# Patient Record
Sex: FEMALE | Race: WHITE | Employment: OTHER | URBAN - METROPOLITAN AREA
[De-identification: names, ages, dates, MRNs, and addresses within clinical notes are randomized per-mention and may not be internally consistent; named-entity substitution may affect disease eponyms.]

---

## 2024-10-11 ENCOUNTER — HOSPITAL ENCOUNTER (OUTPATIENT)
Dept: ULTRASOUND IMAGING | Age: 49
Discharge: HOME | End: 2024-10-13
Payer: COMMERCIAL

## 2024-10-11 DIAGNOSIS — N30.01 ACUTE CYSTITIS WITH HEMATURIA: ICD-10-CM

## 2024-10-11 PROCEDURE — 76770 US EXAM ABDO BACK WALL COMP: CPT

## 2024-10-22 ENCOUNTER — HOSPITAL ENCOUNTER (OUTPATIENT)
Dept: CT IMAGING | Age: 49
Discharge: HOME | End: 2024-10-24
Payer: COMMERCIAL

## 2024-10-22 DIAGNOSIS — N28.89 LEFT RENAL MASS: ICD-10-CM

## 2024-10-22 DIAGNOSIS — R31.0 GROSS HEMATURIA: ICD-10-CM

## 2024-10-22 PROCEDURE — 6360000004 HC RX CONTRAST MEDICATION: Performed by: FAMILY MEDICINE

## 2024-10-22 PROCEDURE — 74178 CT ABD&PLV WO CNTR FLWD CNTR: CPT

## 2024-10-22 PROCEDURE — 2580000003 HC RX 258: Performed by: FAMILY MEDICINE

## 2024-10-22 RX ORDER — SODIUM CHLORIDE 0.9 % (FLUSH) 0.9 %
10 SYRINGE (ML) INJECTION PRN
Status: DISCONTINUED | OUTPATIENT
Start: 2024-10-22 | End: 2024-10-25 | Stop reason: HOSPADM

## 2024-10-22 RX ORDER — IOPAMIDOL 755 MG/ML
100 INJECTION, SOLUTION INTRAVASCULAR
Status: COMPLETED | OUTPATIENT
Start: 2024-10-22 | End: 2024-10-22

## 2024-10-22 RX ORDER — 0.9 % SODIUM CHLORIDE 0.9 %
80 INTRAVENOUS SOLUTION INTRAVENOUS ONCE
Status: COMPLETED | OUTPATIENT
Start: 2024-10-22 | End: 2024-10-22

## 2024-10-22 RX ADMIN — SODIUM CHLORIDE, PRESERVATIVE FREE 10 ML: 5 INJECTION INTRAVENOUS at 09:39

## 2024-10-22 RX ADMIN — IOPAMIDOL 100 ML: 755 INJECTION, SOLUTION INTRAVENOUS at 09:39

## 2024-10-22 RX ADMIN — SODIUM CHLORIDE 80 ML: 9 INJECTION, SOLUTION INTRAVENOUS at 09:39

## 2024-11-01 ENCOUNTER — HOSPITAL ENCOUNTER (OUTPATIENT)
Dept: PREADMISSION TESTING | Age: 49
Setting detail: OUTPATIENT SURGERY
Discharge: HOME | End: 2024-11-05
Payer: COMMERCIAL

## 2024-11-01 ENCOUNTER — ANESTHESIA EVENT (OUTPATIENT)
Dept: OPERATING ROOM | Age: 49
End: 2024-11-01
Payer: COMMERCIAL

## 2024-11-01 VITALS — HEIGHT: 66 IN | WEIGHT: 160 LBS | BODY MASS INDEX: 25.71 KG/M2

## 2024-11-01 RX ORDER — METHYLDOPA/HYDROCHLOROTHIAZIDE 250MG-15MG
1 TABLET ORAL DAILY
COMMUNITY

## 2024-11-04 ENCOUNTER — HOSPITAL ENCOUNTER (OUTPATIENT)
Age: 49
Setting detail: OUTPATIENT SURGERY
Discharge: HOME | End: 2024-11-04
Attending: UROLOGY | Admitting: UROLOGY
Payer: COMMERCIAL

## 2024-11-04 ENCOUNTER — APPOINTMENT (OUTPATIENT)
Dept: GENERAL RADIOLOGY | Age: 49
End: 2024-11-04
Attending: UROLOGY
Payer: COMMERCIAL

## 2024-11-04 ENCOUNTER — ANESTHESIA (OUTPATIENT)
Dept: OPERATING ROOM | Age: 49
End: 2024-11-04
Payer: COMMERCIAL

## 2024-11-04 VITALS
OXYGEN SATURATION: 98 % | HEART RATE: 63 BPM | TEMPERATURE: 97.1 F | RESPIRATION RATE: 16 BRPM | DIASTOLIC BLOOD PRESSURE: 74 MMHG | HEIGHT: 66 IN | WEIGHT: 160 LBS | SYSTOLIC BLOOD PRESSURE: 118 MMHG | BODY MASS INDEX: 25.71 KG/M2

## 2024-11-04 DIAGNOSIS — R31.0 GROSS HEMATURIA: ICD-10-CM

## 2024-11-04 PROCEDURE — 3700000000 HC ANESTHESIA ATTENDED CARE: Performed by: UROLOGY

## 2024-11-04 PROCEDURE — C1769 GUIDE WIRE: HCPCS | Performed by: UROLOGY

## 2024-11-04 PROCEDURE — 3600000012 HC SURGERY LEVEL 2 ADDTL 15MIN: Performed by: UROLOGY

## 2024-11-04 PROCEDURE — 3700000001 HC ADD 15 MINUTES (ANESTHESIA): Performed by: UROLOGY

## 2024-11-04 PROCEDURE — 6360000004 HC RX CONTRAST MEDICATION: Performed by: UROLOGY

## 2024-11-04 PROCEDURE — 2709999900 HC NON-CHARGEABLE SUPPLY: Performed by: UROLOGY

## 2024-11-04 PROCEDURE — 88305 TISSUE EXAM BY PATHOLOGIST: CPT

## 2024-11-04 PROCEDURE — 6370000000 HC RX 637 (ALT 250 FOR IP): Performed by: ANESTHESIOLOGY

## 2024-11-04 PROCEDURE — 81025 URINE PREGNANCY TEST: CPT

## 2024-11-04 PROCEDURE — 6360000002 HC RX W HCPCS

## 2024-11-04 PROCEDURE — 7100000031 HC ASPR PHASE II RECOVERY - ADDTL 15 MIN: Performed by: UROLOGY

## 2024-11-04 PROCEDURE — 2500000003 HC RX 250 WO HCPCS: Performed by: ANESTHESIOLOGY

## 2024-11-04 PROCEDURE — C2617 STENT, NON-COR, TEM W/O DEL: HCPCS | Performed by: UROLOGY

## 2024-11-04 PROCEDURE — 7100000001 HC PACU RECOVERY - ADDTL 15 MIN: Performed by: UROLOGY

## 2024-11-04 PROCEDURE — 7100000010 HC PHASE II RECOVERY - FIRST 15 MIN: Performed by: UROLOGY

## 2024-11-04 PROCEDURE — 2580000003 HC RX 258: Performed by: ANESTHESIOLOGY

## 2024-11-04 PROCEDURE — 2500000003 HC RX 250 WO HCPCS

## 2024-11-04 PROCEDURE — 3600000002 HC SURGERY LEVEL 2 BASE: Performed by: UROLOGY

## 2024-11-04 PROCEDURE — 7100000011 HC PHASE II RECOVERY - ADDTL 15 MIN: Performed by: UROLOGY

## 2024-11-04 PROCEDURE — 6360000002 HC RX W HCPCS: Performed by: UROLOGY

## 2024-11-04 PROCEDURE — 7100000030 HC ASPR PHASE II RECOVERY - FIRST 15 MIN: Performed by: UROLOGY

## 2024-11-04 PROCEDURE — 7100000000 HC PACU RECOVERY - FIRST 15 MIN: Performed by: UROLOGY

## 2024-11-04 PROCEDURE — C1758 CATHETER, URETERAL: HCPCS | Performed by: UROLOGY

## 2024-11-04 DEVICE — URETERAL STENT
Type: IMPLANTABLE DEVICE | Site: URETER | Status: FUNCTIONAL
Brand: POLARIS™ ULTRA

## 2024-11-04 RX ORDER — SODIUM CHLORIDE 0.9 % (FLUSH) 0.9 %
5-40 SYRINGE (ML) INJECTION PRN
Status: DISCONTINUED | OUTPATIENT
Start: 2024-11-04 | End: 2024-11-04 | Stop reason: HOSPADM

## 2024-11-04 RX ORDER — SODIUM CHLORIDE 0.9 % (FLUSH) 0.9 %
5-40 SYRINGE (ML) INJECTION EVERY 12 HOURS SCHEDULED
Status: DISCONTINUED | OUTPATIENT
Start: 2024-11-04 | End: 2024-11-04 | Stop reason: HOSPADM

## 2024-11-04 RX ORDER — HYDROCODONE BITARTRATE AND ACETAMINOPHEN 5; 325 MG/1; MG/1
1 TABLET ORAL EVERY 6 HOURS PRN
Qty: 10 TABLET | Refills: 0 | Status: SHIPPED | OUTPATIENT
Start: 2024-11-04 | End: 2024-11-07

## 2024-11-04 RX ORDER — FENTANYL CITRATE 50 UG/ML
INJECTION, SOLUTION INTRAMUSCULAR; INTRAVENOUS
Status: DISCONTINUED | OUTPATIENT
Start: 2024-11-04 | End: 2024-11-04 | Stop reason: SDUPTHER

## 2024-11-04 RX ORDER — LIDOCAINE HYDROCHLORIDE 20 MG/ML
INJECTION, SOLUTION EPIDURAL; INFILTRATION; INTRACAUDAL; PERINEURAL
Status: DISCONTINUED | OUTPATIENT
Start: 2024-11-04 | End: 2024-11-04 | Stop reason: SDUPTHER

## 2024-11-04 RX ORDER — SODIUM CHLORIDE 9 MG/ML
INJECTION, SOLUTION INTRAVENOUS PRN
Status: DISCONTINUED | OUTPATIENT
Start: 2024-11-04 | End: 2024-11-04 | Stop reason: HOSPADM

## 2024-11-04 RX ORDER — LIDOCAINE HYDROCHLORIDE 10 MG/ML
1 INJECTION, SOLUTION EPIDURAL; INFILTRATION; INTRACAUDAL; PERINEURAL
Status: COMPLETED | OUTPATIENT
Start: 2024-11-04 | End: 2024-11-04

## 2024-11-04 RX ORDER — FENTANYL CITRATE 0.05 MG/ML
25 INJECTION, SOLUTION INTRAMUSCULAR; INTRAVENOUS EVERY 5 MIN PRN
Status: DISCONTINUED | OUTPATIENT
Start: 2024-11-04 | End: 2024-11-04 | Stop reason: HOSPADM

## 2024-11-04 RX ORDER — SODIUM CHLORIDE, SODIUM LACTATE, POTASSIUM CHLORIDE, CALCIUM CHLORIDE 600; 310; 30; 20 MG/100ML; MG/100ML; MG/100ML; MG/100ML
INJECTION, SOLUTION INTRAVENOUS CONTINUOUS
Status: DISCONTINUED | OUTPATIENT
Start: 2024-11-04 | End: 2024-11-04 | Stop reason: HOSPADM

## 2024-11-04 RX ORDER — DEXAMETHASONE SODIUM PHOSPHATE 4 MG/ML
INJECTION, SOLUTION INTRA-ARTICULAR; INTRALESIONAL; INTRAMUSCULAR; INTRAVENOUS; SOFT TISSUE
Status: DISCONTINUED | OUTPATIENT
Start: 2024-11-04 | End: 2024-11-04 | Stop reason: SDUPTHER

## 2024-11-04 RX ORDER — NALOXONE HYDROCHLORIDE 0.4 MG/ML
INJECTION, SOLUTION INTRAMUSCULAR; INTRAVENOUS; SUBCUTANEOUS PRN
Status: DISCONTINUED | OUTPATIENT
Start: 2024-11-04 | End: 2024-11-04 | Stop reason: HOSPADM

## 2024-11-04 RX ORDER — DIPHENHYDRAMINE HYDROCHLORIDE 50 MG/ML
12.5 INJECTION INTRAMUSCULAR; INTRAVENOUS
Status: DISCONTINUED | OUTPATIENT
Start: 2024-11-04 | End: 2024-11-04 | Stop reason: HOSPADM

## 2024-11-04 RX ORDER — CEFAZOLIN SODIUM/WATER 2 G/20 ML
2000 SYRINGE (ML) INTRAVENOUS ONCE
Status: COMPLETED | OUTPATIENT
Start: 2024-11-04 | End: 2024-11-04

## 2024-11-04 RX ORDER — IOPAMIDOL 510 MG/ML
INJECTION, SOLUTION INTRAVASCULAR PRN
Status: DISCONTINUED | OUTPATIENT
Start: 2024-11-04 | End: 2024-11-04 | Stop reason: ALTCHOICE

## 2024-11-04 RX ORDER — ONDANSETRON 2 MG/ML
INJECTION INTRAMUSCULAR; INTRAVENOUS
Status: DISCONTINUED | OUTPATIENT
Start: 2024-11-04 | End: 2024-11-04 | Stop reason: SDUPTHER

## 2024-11-04 RX ORDER — PROPOFOL 10 MG/ML
INJECTION, EMULSION INTRAVENOUS
Status: DISCONTINUED | OUTPATIENT
Start: 2024-11-04 | End: 2024-11-04 | Stop reason: SDUPTHER

## 2024-11-04 RX ORDER — ONDANSETRON 2 MG/ML
4 INJECTION INTRAMUSCULAR; INTRAVENOUS
Status: DISCONTINUED | OUTPATIENT
Start: 2024-11-04 | End: 2024-11-04 | Stop reason: HOSPADM

## 2024-11-04 RX ORDER — METOCLOPRAMIDE HYDROCHLORIDE 5 MG/ML
10 INJECTION INTRAMUSCULAR; INTRAVENOUS
Status: DISCONTINUED | OUTPATIENT
Start: 2024-11-04 | End: 2024-11-04 | Stop reason: HOSPADM

## 2024-11-04 RX ORDER — ACETAMINOPHEN 500 MG
1000 TABLET ORAL ONCE
Status: COMPLETED | OUTPATIENT
Start: 2024-11-04 | End: 2024-11-04

## 2024-11-04 RX ORDER — GABAPENTIN 300 MG/1
300 CAPSULE ORAL ONCE
Status: COMPLETED | OUTPATIENT
Start: 2024-11-04 | End: 2024-11-04

## 2024-11-04 RX ORDER — MIDAZOLAM HYDROCHLORIDE 1 MG/ML
INJECTION, SOLUTION INTRAMUSCULAR; INTRAVENOUS
Status: DISCONTINUED | OUTPATIENT
Start: 2024-11-04 | End: 2024-11-04 | Stop reason: SDUPTHER

## 2024-11-04 RX ADMIN — MIDAZOLAM 2 MG: 1 INJECTION INTRAMUSCULAR; INTRAVENOUS at 13:28

## 2024-11-04 RX ADMIN — LIDOCAINE HYDROCHLORIDE 40 MG: 20 INJECTION, SOLUTION EPIDURAL; INFILTRATION; INTRACAUDAL; PERINEURAL at 13:34

## 2024-11-04 RX ADMIN — FENTANYL CITRATE 25 MCG: 50 INJECTION INTRAMUSCULAR; INTRAVENOUS at 13:34

## 2024-11-04 RX ADMIN — LIDOCAINE HYDROCHLORIDE 1 ML: 10 INJECTION, SOLUTION EPIDURAL; INFILTRATION; INTRACAUDAL; PERINEURAL at 12:15

## 2024-11-04 RX ADMIN — GABAPENTIN 300 MG: 300 CAPSULE ORAL at 12:15

## 2024-11-04 RX ADMIN — ACETAMINOPHEN 1000 MG: 500 TABLET ORAL at 12:14

## 2024-11-04 RX ADMIN — DEXAMETHASONE SODIUM PHOSPHATE 10 MG: 4 INJECTION INTRA-ARTICULAR; INTRALESIONAL; INTRAMUSCULAR; INTRAVENOUS; SOFT TISSUE at 13:40

## 2024-11-04 RX ADMIN — PROPOFOL 160 MG: 10 INJECTION, EMULSION INTRAVENOUS at 13:34

## 2024-11-04 RX ADMIN — SODIUM CHLORIDE, PRESERVATIVE FREE 10 ML: 5 INJECTION INTRAVENOUS at 12:23

## 2024-11-04 RX ADMIN — ONDANSETRON 4 MG: 2 INJECTION, SOLUTION INTRAMUSCULAR; INTRAVENOUS at 13:40

## 2024-11-04 RX ADMIN — Medication 2000 MG: at 13:32

## 2024-11-04 RX ADMIN — SODIUM CHLORIDE, POTASSIUM CHLORIDE, SODIUM LACTATE AND CALCIUM CHLORIDE: 600; 310; 30; 20 INJECTION, SOLUTION INTRAVENOUS at 13:28

## 2024-11-04 RX ADMIN — FENTANYL CITRATE 25 MCG: 50 INJECTION INTRAMUSCULAR; INTRAVENOUS at 13:55

## 2024-11-04 ASSESSMENT — PAIN - FUNCTIONAL ASSESSMENT
PAIN_FUNCTIONAL_ASSESSMENT: NONE - DENIES PAIN
PAIN_FUNCTIONAL_ASSESSMENT: NONE - DENIES PAIN
PAIN_FUNCTIONAL_ASSESSMENT: 0-10

## 2024-11-04 ASSESSMENT — ENCOUNTER SYMPTOMS
RESPIRATORY NEGATIVE: 1
GASTROINTESTINAL NEGATIVE: 1

## 2024-11-04 ASSESSMENT — PAIN DESCRIPTION - DESCRIPTORS: DESCRIPTORS: PRESSURE

## 2024-11-04 NOTE — H&P
HISTORY and PHYSICAL  [facility]       NAME:  Alyce Nolasco  MRN: [id number]   YOB: 1975   Date: 11/4/2024   Age: 49 y.o.  Gender: female       COMPLAINT AND PRESENT HISTORY:     Alyce Nolasco is 49 y.o.,  female, presents for CYSTOSCOPY RETROGRADE PYELOGRAM LEFT, URETEROSCOPY WITH POSSIBLE URETERAL BIOPSY   LEFT   Primary dx: Gross hematuria [R31.0].   HPI:  Alyce Nolasco is 49 y.o.,   female, C/O of  blood in the urine started six weeks ago, poor flow of urine. No nausea,  vomiting or diaphoresis.  No dysuria. No no fever or chills. Pt had US renal on 10/11/24 which showed 3.1 cm solid appearing left renal mass, then she had CT which showed 3.6 cm isodense nonenhancing mass in the left renal collecting system suggesting a neoplastic process likely transitional cell carcinoma.  Pt denies fever/chills, chest pain or SOB.       US RENAL COMPLETE   IMPRESSION:     3.1 cm solid appearing left renal mass.  CT or MRI renal protocol is   recommended for further evaluation.     CT ABDOMEN PELVIS W WO CONTRAST Additional Contrast? None [[id number]]   IMPRESSION:  1. 3.6 cm isodense nonenhancing mass in the left renal collecting system  suggesting a neoplastic process likely transitional cell carcinoma.  2. Multiple hepatic cysts.    Review of additional significant medical hx:  (See chart for additional detail, including current medications /see ROS for current S/S):       NPO status: pt NPO since the past midnight   Medications taken TODAY (with sip of water): none  Anticoagulation status: none    Denies personal hx of blood clots.  Denies personal hx of MRSA infection.  Denies any personal or family hx of previous complications w/anesthesia.    PAST MEDICAL HISTORY   No past medical history on file.    SURGICAL HISTORY       Past Surgical History:   Procedure Laterality Date    DILATION AND CURETTAGE OF UTERUS  2004    FRACTURE SURGERY Left     tib and Fib as child       FAMILY HISTORY     No

## 2024-11-04 NOTE — PROGRESS NOTES
Urine pregnancy negative, results faxed to lab.     
prepared for surgery.  A physical assessment will be performed by a nurse practitioner or house officer.  Your IV will be started and you will meet your anesthesiologist.    When you go to surgery, your family will be directed to the surgical waiting room, where the doctor should speak with them after your surgery.    After surgery, you will be taken to the recovery area.  When you are alert and stable, you will receive instructions and be prepared for discharge.     If you use a Bi-PAP or C-PAP machine, please bring it with you and leave it in the car in case it is needed in recovery room.    Reviewed with Patient over phone and she verbalized understanding

## 2024-11-04 NOTE — ANESTHESIA POSTPROCEDURE EVALUATION
Department of Anesthesiology  Postprocedure Note    Patient: Alyce Nolasco  MRN: [id number]  YOB: 1975  Date of evaluation: 11/4/2024    Procedure Summary       Date: 11/04/24 Room / Location: [place] [address] / [facility]    Anesthesia Start: 1326 Anesthesia Stop: 1420    Procedures:       CYSTOSCOPY RETROGRADE PYELOGRAM LEFT (Left: Ureter)      URETEROSCOPY WITH URETERAL RENAL PELVIC BIOPSY LEFT (Left: Ureter) Diagnosis:       Gross hematuria      (Gross hematuria [R31.0])    Surgeons: Jimbo Aly MD Responsible Provider: Ethan Schofield MD    Anesthesia Type: general ASA Status: 2            Anesthesia Type: No value filed.    Mary Ann Phase I: Mary Ann Score: 10    Mary Ann Phase II: Mary Ann Score: 10    Anesthesia Post Evaluation    Comments: POST- ANESTHESIA EVALUATION       Pt Name: Alyce Nolasco  MRN: [id number]  YOB: 1975  Date of evaluation: 11/4/2024  Time:  3:06 PM      /74   Pulse 63   Temp 97.1 °F (36.2 °C) (Infrared)   Resp 16   Ht 1.676 m (5' 6\")   Wt 72.6 kg (160 lb)   LMP 10/18/2024 (Exact Date)   SpO2 98%   BMI 25.82 kg/m²      Consciousness Level  Awake  Cardiopulmonary Status  Stable  Pain Adequately Treated YES  Nausea / Vomiting  NO  Adequate Hydration  YES  Anesthesia Related Complications NONE      Electronically signed by Angélica Godfrey MD on 11/4/2024 at 3:06 PM           No notable events documented.

## 2024-11-04 NOTE — ANESTHESIA PRE PROCEDURE
Department of Anesthesiology  Preprocedure Note       Name:  Alyce Nolasco   Age:  49 y.o.  :  1975                                          MRN:  [id number]         Date:  2024      Surgeon: Surgeon(s):  Jimbo Aly MD    Procedure: Procedure(s):  CYSTOSCOPY RETROGRADE PYELOGRAM LEFT  URETEROSCOPY WITH POSSIBLE URETERAL BIOPSY LEFT    Medications prior to admission:   Prior to Admission medications    Medication Sig Start Date End Date Taking? Authorizing Provider   Multiple Vitamin (DAILY VITAMIN FORMULA PO) Daily Vitamin    Sergei Tapia MD   Multiple Vitamins-Minerals (HAIR SKIN & NAILS ADVANCED PO) Take by mouth    Sergei Tapia MD   Probiotic CHEW Take 1 tablet by mouth daily    Sergei Tapia MD       Current medications:    Current Facility-Administered Medications   Medication Dose Route Frequency Provider Last Rate Last Admin   • lidocaine PF 1 % injection 1 mL  1 mL IntraDERmal Once PRN Ricardo Au MD       • acetaminophen (TYLENOL) tablet 1,000 mg  1,000 mg Oral Once Ricardo Au MD       • gabapentin (NEURONTIN) capsule 300 mg  300 mg Oral Once Ricardo Au MD       • lactated ringers infusion   IntraVENous Continuous Ricardo Au MD       • sodium chloride flush 0.9 % injection 5-40 mL  5-40 mL IntraVENous 2 times per day Ricardo Au MD       • sodium chloride flush 0.9 % injection 5-40 mL  5-40 mL IntraVENous PRN Ricardo Au MD       • 0.9 % sodium chloride infusion   IntraVENous PRN Ricardo Au MD           Allergies:    Allergies   Allergen Reactions   • Prednisone Shortness Of Breath     High Dose only       Problem List:  There is no problem list on file for this patient.      Past Medical History:  History reviewed. No pertinent past medical history.    Past Surgical History:        Procedure Laterality Date   • DILATION AND CURETTAGE OF UTERUS     • FRACTURE SURGERY Left     tib and Fib as child       Social

## 2024-11-04 NOTE — OP NOTE
Operative Note      Patient: Alyce Nolasco  YOB: 1975  MRN: [id number]    Date of Procedure: 11/4/2024    Pre-Op Diagnosis Codes:      * Gross hematuria [R31.0]    Post-Op Diagnosis: Same       Procedure(s):  CYSTOSCOPY RETROGRADE PYELOGRAM LEFT  URETEROSCOPY WITH URETERAL RENAL PELVIS BIOPSY LEFT    Surgeon(s):  Jimbo Aly MD    Assistant:   * No surgical staff found *    Anesthesia: General    Estimated Blood Loss (mL): Minimal    Complications: None    Specimens:   ID Type Source Tests Collected by Time Destination   1 :  Urine Urine, Cystoscopic FISH, UROVYSION Jimbo Aly MD 11/4/2024 1408    A : LEFT RENAL PELVIX TUMOR Tissue Kidney SURGICAL PATHOLOGY Jimbo Aly MD 11/4/2024 1401        Implants:  Implant Name Type Inv. Item Serial No.  Lot No. LRB No. Used Action   STENT URET 6FR L26CM PERCFLX HYDR+ DBL PGTL THRD 2 - [id number]  STENT URET 6FR L26CM PERCFLX HYDR+ DBL PGTL THRD 2  "[employer] [facility] UROLOGY- [id number] Left 1 Implanted         Drains: * No LDAs found *    Findings:  Infection Present At Time Of Surgery (PATOS) (choose all levels that have infection present):  No infection present  Other Findings:   Alyce is a 49-year-old female with gross hematuria.  CT scan was done which demonstrated a concerning mass possibly in the upper pole the left kidney.  She is here for cystoscopy retrograde pyelogram and ureteroscopy with possible biopsy    Detailed Description of Procedure:   Patient was brought back to the operating room and laid in the operatively supine position.  Once general esthesia was obtained, she was placed in the dorsolithotomy position prepped and draped in the usual sterile fashion.  Timeout was performed, she was prepped identified, and antibiotics were administered.  The cystoscope was inserted through the urethra into the bladder.  Bladder was visualized in its entirety was unremarkable.  No tumors were identified throughout.  Urine was collected

## 2024-11-04 NOTE — DISCHARGE INSTRUCTIONS
Remove stent 48 hours  Prescription sent  Call for follow up        DISCHARGE INSTRUCTIONS FOR CYSTOSCOPY    In order to continue your care at home, please follow the instructions below.    For General Anesthesia  Do not drink any alcoholic beverages or make any legal or important decisions for 24 hours.   No driving or operating machinery for 24 hours.     Diet    Drink plenty of fluids after surgery, unless you are on a fluid restriction.  After general anesthesia, start out eating lightly (broth, soup, crackers, toast, etc.) advancing as tolerated to your usual diet.  Try to avoid spicy or greasy/fatty foods for 24 hours.  Avoid milk products for several hours.    Medications  Take medications as instructed by your surgeon.   Please do not take prescribed pain medication with alcoholic beverages.  Do not drive or operate machinery while taking any prescribed pain medication.    Activities  As instructed by your surgeon.  Limit your activities for 24 hours.  Avoid heavy work or sports until surgeon approves.  No lifting, pushing, pulling, straining until surgeon approves.  You may shower.    Surgery Area or Examination Site  After the cystoscopy, your urethra may be sore at first, and it may burn when you urinate for the first few days after the procedure.  You may feel the need to urinate more often, and your urine may be pink.   These symptoms should get better in 1 or 2 days.     Call your surgeon for the following:  You have pain that does not get better after you take pain medicine.   For an oral temperature (by mouth) is 101 degrees or higher, chills, or excessive sweating.  Persistent nausea or vomiting and can’t keep fluids down.  You have trouble passing urine or stool, especially if you have pain or swelling in your lower belly.   If you are unable to urinate within 8 hours of surgery.  Your urine is still red or you see blood clots after you have urinated several times.  Your urine gets cloudy or smells

## 2024-11-07 LAB — SURGICAL PATHOLOGY REPORT: NORMAL

## 2024-11-11 LAB — UROTHELIAL CANCER DETECTION: NORMAL

## 2024-11-26 RX ORDER — SODIUM CHLORIDE, SODIUM LACTATE, POTASSIUM CHLORIDE, CALCIUM CHLORIDE 600; 310; 30; 20 MG/100ML; MG/100ML; MG/100ML; MG/100ML
INJECTION, SOLUTION INTRAVENOUS CONTINUOUS
OUTPATIENT
Start: 2024-11-26

## 2024-11-26 NOTE — DISCHARGE INSTRUCTIONS
Pre-operative Instructions           NOTHING to eat or drink after midnight the night prior to surgery   (This includes gum, candy, mints, chewing tobacco, etc). Smoking cessation is always advised.   (Follow bowel prep or diet instructions as/if instructed by your surgeon.)    Please arrive at the surgery center (Entrance B) by 11:00 AM on 12/16/2024 (or as directed by your surgeon's office).  See Directons to Surgery Center on next page.     Please take only the following medication(s) the day of surgery with a small sip of water: n/a    If applicable:   -Use/bring daily inhalers with you   -Do not take diabetic medications on the day of surgery.    Please stop any blood thinning medications  AS DIRECTED BY PRESCRIBING PROVIDER!     Failure to stop these medications as instructed (too soon or too late) may result in injury to you, or your surgery may need to be rescheduled.   Below is a list of some examples for your reference. This is not an all-inclusive list and if you have any questions/concerns, please check with your surgeon's office.     Antiplatelets : (stop blood cells (called platelets) from sticking together and forming a blood clot):   Aspirin (Bufferin, Ecotrin), Clopidogrel (Plavix), Ticagrelor (Brilinta), Prasugrel (Effient), Dipyridamole/aspirin (Aggrenox), Ticlopidine (Ticlid), Eptifibatide (Integrilin)    Anticoagulants: (slow down your body's process of making clots):   Warfarin (Coumadin), Rivaroxaban (Xarelto), Dabigatran (Pradaxa), Apixaban (Eliquis), Edoxaban (Savaysa), Heparin, Enoxaparin (Lovenox), Fondaparinux (Arixtra)    NSAIDS: Aspirin (Bufferin, Ecotrin), Ibuprofen (Motrin, Nuprin,Advil), Naproxen (Aleve),Meloxicam (Mobic), Celecoxib (Celebrex), Diclofenac (Voltaren), Etodolac (Lodine), Indomethacin, Ketorolac, Nabumetone, Oxaprozin (Daypro), Piroxicam (Feldene), Excedrin (has aspirin in it)    Herbals/supplements: Bromelain, Cinnamon, Cayenne Pepper, Dong quai (female ginseng), Fish

## 2024-12-03 ENCOUNTER — HOSPITAL ENCOUNTER (OUTPATIENT)
Dept: PREADMISSION TESTING | Age: 49
Discharge: HOME | End: 2024-12-07
Payer: COMMERCIAL

## 2024-12-03 VITALS
HEIGHT: 66 IN | HEART RATE: 89 BPM | WEIGHT: 160 LBS | SYSTOLIC BLOOD PRESSURE: 137 MMHG | TEMPERATURE: 98.2 F | OXYGEN SATURATION: 100 % | DIASTOLIC BLOOD PRESSURE: 81 MMHG | BODY MASS INDEX: 25.71 KG/M2 | RESPIRATION RATE: 18 BRPM

## 2024-12-03 LAB
ABO + RH BLD: NORMAL
ANION GAP SERPL CALCULATED.3IONS-SCNC: 9 MMOL/L (ref 9–16)
ARM BAND NUMBER: NORMAL
BLOOD BANK SAMPLE EXPIRATION: NORMAL
BLOOD GROUP ANTIBODIES SERPL: NEGATIVE
BUN SERPL-MCNC: 10 MG/DL (ref 6–20)
CALCIUM SERPL-MCNC: 9.5 MG/DL (ref 8.6–10.4)
CHLORIDE SERPL-SCNC: 104 MMOL/L (ref 98–107)
CO2 SERPL-SCNC: 27 MMOL/L (ref 20–31)
CREAT SERPL-MCNC: 0.8 MG/DL (ref 0.6–0.9)
ERYTHROCYTE [DISTWIDTH] IN BLOOD BY AUTOMATED COUNT: 11.3 % (ref 11.8–14.4)
GFR, ESTIMATED: >90 ML/MIN/1.73M2
GLUCOSE SERPL-MCNC: 96 MG/DL (ref 74–99)
HCT VFR BLD AUTO: 40.1 % (ref 36.3–47.1)
HGB BLD-MCNC: 12.9 G/DL (ref 11.9–15.1)
MCH RBC QN AUTO: 30.4 PG (ref 25.2–33.5)
MCHC RBC AUTO-ENTMCNC: 32.2 G/DL (ref 28.4–34.8)
MCV RBC AUTO: 94.4 FL (ref 82.6–102.9)
NRBC BLD-RTO: 0 PER 100 WBC
PLATELET # BLD AUTO: 378 K/UL (ref 138–453)
PMV BLD AUTO: 9.8 FL (ref 8.1–13.5)
POTASSIUM SERPL-SCNC: 3.7 MMOL/L (ref 3.7–5.3)
RBC # BLD AUTO: 4.25 M/UL (ref 3.95–5.11)
SODIUM SERPL-SCNC: 140 MMOL/L (ref 136–145)
WBC OTHER # BLD: 8.6 K/UL (ref 3.5–11.3)

## 2024-12-03 PROCEDURE — 80048 BASIC METABOLIC PNL TOTAL CA: CPT

## 2024-12-03 PROCEDURE — 86901 BLOOD TYPING SEROLOGIC RH(D): CPT

## 2024-12-03 PROCEDURE — 85027 COMPLETE CBC AUTOMATED: CPT

## 2024-12-03 PROCEDURE — 86850 RBC ANTIBODY SCREEN: CPT

## 2024-12-03 PROCEDURE — 87086 URINE CULTURE/COLONY COUNT: CPT

## 2024-12-03 PROCEDURE — 86900 BLOOD TYPING SEROLOGIC ABO: CPT

## 2024-12-03 NOTE — PROGRESS NOTES
Anesthesia Focused Assessment  Upcoming surgery:   12/16/2024 XI ROBOTIC LAPAROSCOPIC  NEPHROURETERECTOMY - Left Jimbo Aly MD     History of anesthesia complications:  PONV  Family history of anesthesia complications:  unknown     METS functional capacity:   -Moderate/Excellent >4     + Covid-19 test in last 8 weeks? no    STOP-BANG Sleep Apnea Questionnaire  SNORE loudly (heard through closed doors)?   No  TIRED, fatigued, sleepy during daytime?    No  OBSERVED stopping breathing during sleep?   No  High blood PRESSURE or being treated?    No    BMI over 35?        No  AGE over 50?        No  NECK circumference over 16\"?     No  GENDER (male)?       No             Total zero  High risk 5-8  Intermediate risk 3-4  Low risk 0-2    ----------------------------------------------------------------------------------------------------------------------  ROMAN:                                             no  If yes, machine?:                              Type 1 DM:                                   no  T2DM:                                           no    Coronary Artery Disease:             no  Hypertension:                               no  Defib / AICD / Pacemaker:           no  Active, no cardiopulmonary complaints. Denies cardiac history.   Patient reports she follows with PCP (Dr. Canada) regularly, all notes in [facility].     EKG today:    Normal sinus rhythm   Nonspecific ST and T wave abnormality   Abnormal ECG   No previous ECGs available     Renal Failure:                               no  If yes, on dialysis?:                           Active smoker:                              no, former  Drinks Alcohol:                              daily, couple drinks  Illicit drugs:                                    no    Dentition:                                      benign     Past Medical History:   Diagnosis Date    Anxiety     Colon polyp     Cystitis with hematuria     PONV (postoperative nausea and vomiting)

## 2024-12-03 NOTE — H&P
Father     No partnership data on file     family history includes Cancer in her mother; Colon Polyps in her father.    Review of Systems:  CONSTITUTIONAL:   negative for fevers, chills, fatigue and malaise    EYES:   negative for double vision, blurred vision and photophobia    HEENT:   negative for tinnitus, epistaxis and sore throat     RESPIRATORY:   negative for cough, shortness of breath, wheezing     CARDIOVASCULAR:   negative for chest pain, palpitations, syncope, edema     GASTROINTESTINAL:   negative for nausea, vomiting     GENITOURINARY:   negative for incontinence  +per HPI   MUSCULOSKELETAL:   negative for neck or back pain     NEUROLOGICAL:   Negative for weakness and tingling  negative for headaches and dizziness     PSYCHIATRIC:   negative for anxiety       OBJECTIVE:   VITALS:  height is 1.676 m (5' 6\") and weight is 72.6 kg (160 lb). Her temporal temperature is 98.2 °F (36.8 °C). Her blood pressure is 137/81 and her pulse is 89. Her respiration is 18 and oxygen saturation is 100%.   CONSTITUTIONAL:alert & oriented x 3, no acute distress. Friendly.    SKIN:  Warm and dry, no rashes on exposed areas of skin   HEAD:  Normocephalic, atraumatic   EYES: EOMs intact. PERRL.   EARS:  Equal bilaterally, no edema or thickening, skin is intact without lumps or lesions. No discharge. Hearing grossly WNL.    NOSE:  Nares patent.  No rhinorrhea   MOUTH/THROAT:  Mucous membranes moist, tongue is pink, uvula midline, teeth appear to be intact  NECK:full ROM  LUNGS: Respirations even and non-labored. Clear to auscultation bilaterally, no wheezes, rales, or rhonchi.    CARDIOVASCULAR: Regular rate and rhythm, no murmurs/rubs/gallops   ABDOMEN: soft, non-tender, non-distended, bowel sounds active x 4   EXTREMITIES: No edema bilateral lower extremities.  No varicosities bilateral lower extremities.   NEUROLOGIC: CN II-XII are grossly intact.  Gait not assessed.  Testing:   EK/3/24  Labs

## 2024-12-04 LAB
EKG ATRIAL RATE: 78 BPM
EKG P AXIS: 70 DEGREES
EKG P-R INTERVAL: 136 MS
EKG Q-T INTERVAL: 384 MS
EKG QRS DURATION: 94 MS
EKG QTC CALCULATION (BAZETT): 437 MS
EKG R AXIS: 29 DEGREES
EKG T AXIS: 54 DEGREES
EKG VENTRICULAR RATE: 78 BPM
MICROORGANISM SPEC CULT: NORMAL
SPECIMEN DESCRIPTION: NORMAL

## 2024-12-04 RX ORDER — ENOXAPARIN SODIUM 100 MG/ML
40 INJECTION SUBCUTANEOUS ONCE
OUTPATIENT
Start: 2024-12-04 | End: 2024-12-04

## 2024-12-10 LAB
ABO + RH BLD: NORMAL
ARM BAND NUMBER: NORMAL
BLOOD BANK SAMPLE EXPIRATION: NORMAL
BLOOD GROUP ANTIBODIES SERPL: NEGATIVE

## 2024-12-10 NOTE — OR NURSING
Case 12/16/24 cancelled for . 's , rescheduled to 12/17/24 at [facility] .  Will require NEW type and Screen at [facility].

## 2024-12-12 ENCOUNTER — HOSPITAL ENCOUNTER (OUTPATIENT)
Dept: PREADMISSION TESTING | Age: 49
Discharge: HOME | End: 2024-12-16
Payer: COMMERCIAL

## 2024-12-12 VITALS
RESPIRATION RATE: 18 BRPM | WEIGHT: 157 LBS | HEART RATE: 78 BPM | HEIGHT: 66 IN | BODY MASS INDEX: 25.23 KG/M2 | OXYGEN SATURATION: 99 % | DIASTOLIC BLOOD PRESSURE: 79 MMHG | SYSTOLIC BLOOD PRESSURE: 139 MMHG | TEMPERATURE: 97.7 F

## 2024-12-12 PROCEDURE — 36415 COLL VENOUS BLD VENIPUNCTURE: CPT

## 2024-12-12 PROCEDURE — 86900 BLOOD TYPING SEROLOGIC ABO: CPT

## 2024-12-12 PROCEDURE — 86850 RBC ANTIBODY SCREEN: CPT

## 2024-12-12 PROCEDURE — 86901 BLOOD TYPING SEROLOGIC RH(D): CPT

## 2024-12-12 NOTE — DISCHARGE INSTRUCTIONS
Pre-op Instructions For Out-Patient Surgery    Medication Instructions:  Please stop herbs and any supplements now (includes vitamins and minerals).    For these medications:  Dulaglutide (Trulicity), Exenatide (Byetta and Bydureon, Liraglutide (Victoza), Lixisenatide (Adlyxin), Semaglutide (Ozempic and Rybelsus), Tirzepatide (Mounjaro)- Stop 1 week prior if taking weekly or 1 day prior if taking every 12 hours or daily.     Please contact your surgeon and prescribing physician for pre-op instructions for any blood thinners.    If you have inhalers/aerosol treatments at home, please use them the morning of your surgery and bring the inhalers with you to the hospital.    Please take the following medications the morning of your surgery with a sip of water:    None    Surgery Instructions:  After midnight before surgery:  Do not eat or drink anything, including water, mints, gum, and hard candy.  You may brush your teeth without swallowing.  No smoking, chewing tobacco, or street drugs.    Please shower or bathe before surgery.  If you were given Surgical Scrub Chlorhexidine Gluconate Liquid (CHG), please shower the night before and the morning of your surgery following the detailed instructions you received during your pre-admission visit.     Please do not wear any cologne, lotion, powder, deodorant, jewelry, piercings, perfume, makeup, nail polish, hair accessories, or hair spray on the day of surgery.  Wear loose comfortable clothing.    Leave your valuables at home but bring a payment source for any after-surgery prescriptions you plan to fill at [facility].  Bring a storage case for any glasses/contacts.    An adult who is responsible for you MUST drive you home and should be with you for the first 24 hours after surgery.     If having out-patient knee and foot surgeries, please arrange for planned crutches, walker, or wheelchair before arriving to the hospital.    The Day  of Surgery:  Arrive at [facility] Surgery Entrance at the time directed by your surgeon and check in at the desk.     If you have a living will or healthcare power of , please bring a copy.    You will be taken to the pre-op holding area where you will be prepared for surgery.  A physical assessment will be performed by a nurse practitioner or house officer.  Your IV will be started and you will meet your anesthesiologist.    When you go to surgery, your family will be directed to the surgical waiting room, where the doctor should speak with them after your surgery.    After surgery, you will be taken to the recovery area.  When you are alert and stable, you will receive instructions and be prepared for discharge.     If you use a Bi-PAP or C-PAP machine, please bring it with you and leave it in the car in case it is needed in recovery room.

## 2024-12-16 ENCOUNTER — ANESTHESIA EVENT (OUTPATIENT)
Dept: OPERATING ROOM | Age: 49
End: 2024-12-16
Payer: COMMERCIAL

## 2024-12-16 ENCOUNTER — HOSPITAL ENCOUNTER (OUTPATIENT)
Dept: GENERAL RADIOLOGY | Age: 49
Discharge: HOME | End: 2024-12-18
Payer: COMMERCIAL

## 2024-12-16 ENCOUNTER — HOSPITAL ENCOUNTER (OUTPATIENT)
Age: 49
Discharge: HOME | End: 2024-12-18
Payer: COMMERCIAL

## 2024-12-16 DIAGNOSIS — Z01.818 PREOP EXAMINATION: ICD-10-CM

## 2024-12-16 PROCEDURE — 71046 X-RAY EXAM CHEST 2 VIEWS: CPT

## 2024-12-16 NOTE — PRE-PROCEDURE INSTRUCTIONS
Nothing to eat after midnight.yes  Are you taking any blood thinners? When was the last day?n/a  Make sure to use Hibiclens prior to surgery.yes  Remove any jewelry and body piercings.yes  Do you wear glasses? If so, please bring a case to store them in.  Are you having any Covid symptoms?no  Do you have any new rashes, infections, etc. that we should be aware of?no  Do you have a ride home the day of surgery? It cannot be a cab or medical transportation.yes  Verify surgery time and what time to arrive at hospital. 1100/1300

## 2024-12-17 ENCOUNTER — HOSPITAL ENCOUNTER (OUTPATIENT)
Age: 49
Setting detail: OBSERVATION
Discharge: HOME | End: 2024-12-20
Attending: UROLOGY | Admitting: UROLOGY
Payer: COMMERCIAL

## 2024-12-17 ENCOUNTER — ANESTHESIA (OUTPATIENT)
Dept: OPERATING ROOM | Age: 49
End: 2024-12-17
Payer: COMMERCIAL

## 2024-12-17 DIAGNOSIS — C66.2 UROTHELIAL CARCINOMA OF LEFT DISTAL URETER (HCC): ICD-10-CM

## 2024-12-17 PROBLEM — N28.89 RENAL MASS: Status: ACTIVE | Noted: 2024-12-17

## 2024-12-17 LAB
BACTERIA URNS QL MICRO: ABNORMAL
BILIRUB UR QL STRIP: NEGATIVE
CASTS #/AREA URNS LPF: ABNORMAL /LPF
CLARITY UR: ABNORMAL
COLOR UR: YELLOW
EPI CELLS #/AREA URNS HPF: ABNORMAL /HPF
GLUCOSE UR STRIP-MCNC: NEGATIVE MG/DL
HCG, PREGNANCY URINE (POC): NEGATIVE
HGB UR QL STRIP.AUTO: ABNORMAL
KETONES UR STRIP-MCNC: NEGATIVE MG/DL
LEUKOCYTE ESTERASE UR QL STRIP: NEGATIVE
NITRITE UR QL STRIP: NEGATIVE
PH UR STRIP: 5.5 [PH] (ref 5–8)
PROT UR STRIP-MCNC: ABNORMAL MG/DL
RBC #/AREA URNS HPF: ABNORMAL /HPF
SP GR UR STRIP: 1.02 (ref 1–1.03)
UROBILINOGEN UR STRIP-ACNC: NORMAL EU/DL (ref 0–1)
WBC #/AREA URNS HPF: ABNORMAL /HPF

## 2024-12-17 PROCEDURE — 2500000003 HC RX 250 WO HCPCS

## 2024-12-17 PROCEDURE — 7100000000 HC PACU RECOVERY - FIRST 15 MIN: Performed by: UROLOGY

## 2024-12-17 PROCEDURE — 6360000002 HC RX W HCPCS

## 2024-12-17 PROCEDURE — 6360000002 HC RX W HCPCS: Performed by: NURSE ANESTHETIST, CERTIFIED REGISTERED

## 2024-12-17 PROCEDURE — 81001 URINALYSIS AUTO W/SCOPE: CPT

## 2024-12-17 PROCEDURE — 88307 TISSUE EXAM BY PATHOLOGIST: CPT

## 2024-12-17 PROCEDURE — 2580000003 HC RX 258: Performed by: UROLOGY

## 2024-12-17 PROCEDURE — C1889 IMPLANT/INSERT DEVICE, NOC: HCPCS | Performed by: UROLOGY

## 2024-12-17 PROCEDURE — 6360000002 HC RX W HCPCS: Performed by: UROLOGY

## 2024-12-17 PROCEDURE — 2580000003 HC RX 258: Performed by: ANESTHESIOLOGY

## 2024-12-17 PROCEDURE — 6370000000 HC RX 637 (ALT 250 FOR IP): Performed by: UROLOGY

## 2024-12-17 PROCEDURE — 6360000002 HC RX W HCPCS: Performed by: ANESTHESIOLOGY

## 2024-12-17 PROCEDURE — 3600000019 HC SURGERY ROBOT ADDTL 15MIN: Performed by: UROLOGY

## 2024-12-17 PROCEDURE — S2900 ROBOTIC SURGICAL SYSTEM: HCPCS | Performed by: UROLOGY

## 2024-12-17 PROCEDURE — 3700000001 HC ADD 15 MINUTES (ANESTHESIA): Performed by: UROLOGY

## 2024-12-17 PROCEDURE — 6370000000 HC RX 637 (ALT 250 FOR IP): Performed by: ANESTHESIOLOGY

## 2024-12-17 PROCEDURE — 2709999900 HC NON-CHARGEABLE SUPPLY: Performed by: UROLOGY

## 2024-12-17 PROCEDURE — 3700000000 HC ANESTHESIA ATTENDED CARE: Performed by: UROLOGY

## 2024-12-17 PROCEDURE — 3600000009 HC SURGERY ROBOT BASE: Performed by: UROLOGY

## 2024-12-17 PROCEDURE — 7100000001 HC PACU RECOVERY - ADDTL 15 MIN: Performed by: UROLOGY

## 2024-12-17 PROCEDURE — 87086 URINE CULTURE/COLONY COUNT: CPT

## 2024-12-17 PROCEDURE — 2500000003 HC RX 250 WO HCPCS: Performed by: NURSE ANESTHETIST, CERTIFIED REGISTERED

## 2024-12-17 PROCEDURE — 81025 URINE PREGNANCY TEST: CPT

## 2024-12-17 PROCEDURE — G0378 HOSPITAL OBSERVATION PER HR: HCPCS

## 2024-12-17 PROCEDURE — 2720000010 HC SURG SUPPLY STERILE: Performed by: UROLOGY

## 2024-12-17 DEVICE — HEMOLOK L 6 CLIPS/CART
Type: IMPLANTABLE DEVICE | Status: FUNCTIONAL
Brand: WECK

## 2024-12-17 RX ORDER — SODIUM CHLORIDE 0.9 % (FLUSH) 0.9 %
5-40 SYRINGE (ML) INJECTION PRN
Status: DISCONTINUED | OUTPATIENT
Start: 2024-12-17 | End: 2024-12-17 | Stop reason: HOSPADM

## 2024-12-17 RX ORDER — SODIUM CHLORIDE 9 MG/ML
INJECTION, SOLUTION INTRAVENOUS PRN
Status: DISCONTINUED | OUTPATIENT
Start: 2024-12-17 | End: 2024-12-17 | Stop reason: HOSPADM

## 2024-12-17 RX ORDER — GABAPENTIN 300 MG/1
300 CAPSULE ORAL ONCE
Status: COMPLETED | OUTPATIENT
Start: 2024-12-17 | End: 2024-12-17

## 2024-12-17 RX ORDER — ONDANSETRON 4 MG/1
4 TABLET, ORALLY DISINTEGRATING ORAL EVERY 8 HOURS PRN
Status: DISCONTINUED | OUTPATIENT
Start: 2024-12-17 | End: 2024-12-20 | Stop reason: HOSPADM

## 2024-12-17 RX ORDER — SODIUM CHLORIDE 9 MG/ML
INJECTION, SOLUTION INTRAVENOUS PRN
Status: DISCONTINUED | OUTPATIENT
Start: 2024-12-17 | End: 2024-12-17

## 2024-12-17 RX ORDER — ONDANSETRON 2 MG/ML
4 INJECTION INTRAMUSCULAR; INTRAVENOUS EVERY 6 HOURS PRN
Status: DISCONTINUED | OUTPATIENT
Start: 2024-12-17 | End: 2024-12-20 | Stop reason: HOSPADM

## 2024-12-17 RX ORDER — SODIUM CHLORIDE 0.9 % (FLUSH) 0.9 %
5-40 SYRINGE (ML) INJECTION PRN
Status: DISCONTINUED | OUTPATIENT
Start: 2024-12-17 | End: 2024-12-20 | Stop reason: HOSPADM

## 2024-12-17 RX ORDER — ROCURONIUM BROMIDE 10 MG/ML
INJECTION, SOLUTION INTRAVENOUS
Status: DISCONTINUED | OUTPATIENT
Start: 2024-12-17 | End: 2024-12-17 | Stop reason: SDUPTHER

## 2024-12-17 RX ORDER — ONDANSETRON 2 MG/ML
INJECTION INTRAMUSCULAR; INTRAVENOUS
Status: DISCONTINUED | OUTPATIENT
Start: 2024-12-17 | End: 2024-12-17 | Stop reason: SDUPTHER

## 2024-12-17 RX ORDER — PROPOFOL 10 MG/ML
INJECTION, EMULSION INTRAVENOUS
Status: DISCONTINUED | OUTPATIENT
Start: 2024-12-17 | End: 2024-12-17 | Stop reason: SDUPTHER

## 2024-12-17 RX ORDER — ACETAMINOPHEN 500 MG
1000 TABLET ORAL ONCE
Status: COMPLETED | OUTPATIENT
Start: 2024-12-17 | End: 2024-12-17

## 2024-12-17 RX ORDER — ACETAMINOPHEN 325 MG/1
650 TABLET ORAL EVERY 6 HOURS PRN
Status: DISCONTINUED | OUTPATIENT
Start: 2024-12-17 | End: 2024-12-17

## 2024-12-17 RX ORDER — MORPHINE SULFATE 2 MG/ML
2 INJECTION, SOLUTION INTRAMUSCULAR; INTRAVENOUS
Status: DISCONTINUED | OUTPATIENT
Start: 2024-12-17 | End: 2024-12-20

## 2024-12-17 RX ORDER — ENOXAPARIN SODIUM 100 MG/ML
40 INJECTION SUBCUTANEOUS ONCE
Status: COMPLETED | OUTPATIENT
Start: 2024-12-17 | End: 2024-12-17

## 2024-12-17 RX ORDER — HYDROCODONE BITARTRATE AND ACETAMINOPHEN 5; 325 MG/1; MG/1
2 TABLET ORAL EVERY 6 HOURS PRN
Status: DISCONTINUED | OUTPATIENT
Start: 2024-12-17 | End: 2024-12-20

## 2024-12-17 RX ORDER — CEFAZOLIN SODIUM/WATER 2 G/20 ML
2000 SYRINGE (ML) INTRAVENOUS EVERY 8 HOURS
Status: DISCONTINUED | OUTPATIENT
Start: 2024-12-17 | End: 2024-12-17 | Stop reason: SDUPTHER

## 2024-12-17 RX ORDER — FENTANYL CITRATE 0.05 MG/ML
25 INJECTION, SOLUTION INTRAMUSCULAR; INTRAVENOUS EVERY 5 MIN PRN
Status: DISCONTINUED | OUTPATIENT
Start: 2024-12-17 | End: 2024-12-17 | Stop reason: HOSPADM

## 2024-12-17 RX ORDER — HYDROCODONE BITARTRATE AND ACETAMINOPHEN 5; 325 MG/1; MG/1
1 TABLET ORAL EVERY 6 HOURS PRN
Status: DISCONTINUED | OUTPATIENT
Start: 2024-12-17 | End: 2024-12-20

## 2024-12-17 RX ORDER — ONDANSETRON 2 MG/ML
4 INJECTION INTRAMUSCULAR; INTRAVENOUS EVERY 6 HOURS PRN
Status: DISCONTINUED | OUTPATIENT
Start: 2024-12-17 | End: 2024-12-17 | Stop reason: HOSPADM

## 2024-12-17 RX ORDER — METOCLOPRAMIDE HYDROCHLORIDE 5 MG/ML
10 INJECTION INTRAMUSCULAR; INTRAVENOUS
Status: DISCONTINUED | OUTPATIENT
Start: 2024-12-17 | End: 2024-12-17 | Stop reason: HOSPADM

## 2024-12-17 RX ORDER — SODIUM CHLORIDE 9 MG/ML
INJECTION, SOLUTION INTRAVENOUS CONTINUOUS
Status: DISCONTINUED | OUTPATIENT
Start: 2024-12-17 | End: 2024-12-17

## 2024-12-17 RX ORDER — MORPHINE SULFATE 2 MG/ML
4 INJECTION, SOLUTION INTRAMUSCULAR; INTRAVENOUS
Status: DISCONTINUED | OUTPATIENT
Start: 2024-12-17 | End: 2024-12-20

## 2024-12-17 RX ORDER — CEFAZOLIN SODIUM/WATER 2 G/20 ML
2000 SYRINGE (ML) INTRAVENOUS EVERY 8 HOURS
Status: DISCONTINUED | OUTPATIENT
Start: 2024-12-17 | End: 2024-12-18

## 2024-12-17 RX ORDER — LIDOCAINE HYDROCHLORIDE 10 MG/ML
INJECTION, SOLUTION EPIDURAL; INFILTRATION; INTRACAUDAL; PERINEURAL
Status: DISCONTINUED | OUTPATIENT
Start: 2024-12-17 | End: 2024-12-17 | Stop reason: SDUPTHER

## 2024-12-17 RX ORDER — FENTANYL CITRATE 50 UG/ML
INJECTION, SOLUTION INTRAMUSCULAR; INTRAVENOUS
Status: DISCONTINUED | OUTPATIENT
Start: 2024-12-17 | End: 2024-12-17 | Stop reason: SDUPTHER

## 2024-12-17 RX ORDER — ONDANSETRON 2 MG/ML
4 INJECTION INTRAMUSCULAR; INTRAVENOUS
Status: DISCONTINUED | OUTPATIENT
Start: 2024-12-17 | End: 2024-12-17

## 2024-12-17 RX ORDER — SODIUM CHLORIDE 0.9 % (FLUSH) 0.9 %
5-40 SYRINGE (ML) INJECTION PRN
Status: DISCONTINUED | OUTPATIENT
Start: 2024-12-17 | End: 2024-12-17

## 2024-12-17 RX ORDER — SCOLOPAMINE TRANSDERMAL SYSTEM 1 MG/1
1 PATCH, EXTENDED RELEASE TRANSDERMAL ONCE
Status: COMPLETED | OUTPATIENT
Start: 2024-12-17 | End: 2024-12-20

## 2024-12-17 RX ORDER — LABETALOL HYDROCHLORIDE 5 MG/ML
10 INJECTION, SOLUTION INTRAVENOUS
Status: DISCONTINUED | OUTPATIENT
Start: 2024-12-17 | End: 2024-12-17 | Stop reason: HOSPADM

## 2024-12-17 RX ORDER — BUPIVACAINE HYDROCHLORIDE 2.5 MG/ML
INJECTION, SOLUTION EPIDURAL; INFILTRATION; INTRACAUDAL PRN
Status: DISCONTINUED | OUTPATIENT
Start: 2024-12-17 | End: 2024-12-17 | Stop reason: ALTCHOICE

## 2024-12-17 RX ORDER — LIDOCAINE HYDROCHLORIDE 10 MG/ML
1 INJECTION, SOLUTION EPIDURAL; INFILTRATION; INTRACAUDAL; PERINEURAL
Status: COMPLETED | OUTPATIENT
Start: 2024-12-17 | End: 2024-12-17

## 2024-12-17 RX ORDER — DIPHENHYDRAMINE HYDROCHLORIDE 50 MG/ML
12.5 INJECTION INTRAMUSCULAR; INTRAVENOUS
Status: DISCONTINUED | OUTPATIENT
Start: 2024-12-17 | End: 2024-12-17 | Stop reason: HOSPADM

## 2024-12-17 RX ORDER — HYDRALAZINE HYDROCHLORIDE 20 MG/ML
10 INJECTION INTRAMUSCULAR; INTRAVENOUS
Status: DISCONTINUED | OUTPATIENT
Start: 2024-12-17 | End: 2024-12-17 | Stop reason: HOSPADM

## 2024-12-17 RX ORDER — SODIUM CHLORIDE 0.9 % (FLUSH) 0.9 %
5-40 SYRINGE (ML) INJECTION EVERY 12 HOURS SCHEDULED
Status: DISCONTINUED | OUTPATIENT
Start: 2024-12-17 | End: 2024-12-17 | Stop reason: HOSPADM

## 2024-12-17 RX ORDER — MIDAZOLAM HYDROCHLORIDE 1 MG/ML
INJECTION, SOLUTION INTRAMUSCULAR; INTRAVENOUS
Status: DISCONTINUED | OUTPATIENT
Start: 2024-12-17 | End: 2024-12-17 | Stop reason: SDUPTHER

## 2024-12-17 RX ORDER — SODIUM CHLORIDE 0.9 % (FLUSH) 0.9 %
5-40 SYRINGE (ML) INJECTION EVERY 12 HOURS SCHEDULED
Status: DISCONTINUED | OUTPATIENT
Start: 2024-12-17 | End: 2024-12-17

## 2024-12-17 RX ORDER — DEXAMETHASONE SODIUM PHOSPHATE 4 MG/ML
INJECTION, SOLUTION INTRA-ARTICULAR; INTRALESIONAL; INTRAMUSCULAR; INTRAVENOUS; SOFT TISSUE
Status: DISCONTINUED | OUTPATIENT
Start: 2024-12-17 | End: 2024-12-17 | Stop reason: SDUPTHER

## 2024-12-17 RX ORDER — SODIUM CHLORIDE 9 MG/ML
INJECTION, SOLUTION INTRAVENOUS PRN
Status: DISCONTINUED | OUTPATIENT
Start: 2024-12-17 | End: 2024-12-19

## 2024-12-17 RX ORDER — NALOXONE HYDROCHLORIDE 0.4 MG/ML
INJECTION, SOLUTION INTRAMUSCULAR; INTRAVENOUS; SUBCUTANEOUS PRN
Status: DISCONTINUED | OUTPATIENT
Start: 2024-12-17 | End: 2024-12-17 | Stop reason: HOSPADM

## 2024-12-17 RX ADMIN — HYDROMORPHONE HYDROCHLORIDE 0.5 MG: 1 INJECTION, SOLUTION INTRAMUSCULAR; INTRAVENOUS; SUBCUTANEOUS at 18:40

## 2024-12-17 RX ADMIN — SODIUM CHLORIDE: 9 INJECTION, SOLUTION INTRAVENOUS at 13:04

## 2024-12-17 RX ADMIN — FENTANYL CITRATE 50 MCG: 50 INJECTION INTRAMUSCULAR; INTRAVENOUS at 17:09

## 2024-12-17 RX ADMIN — ROCURONIUM BROMIDE 20 MG: 10 INJECTION, SOLUTION INTRAVENOUS at 14:46

## 2024-12-17 RX ADMIN — ROCURONIUM BROMIDE 10 MG: 10 INJECTION, SOLUTION INTRAVENOUS at 17:10

## 2024-12-17 RX ADMIN — ROCURONIUM BROMIDE 20 MG: 10 INJECTION, SOLUTION INTRAVENOUS at 16:07

## 2024-12-17 RX ADMIN — PROPOFOL 180 MG: 10 INJECTION, EMULSION INTRAVENOUS at 13:07

## 2024-12-17 RX ADMIN — ENOXAPARIN SODIUM 40 MG: 100 INJECTION SUBCUTANEOUS at 11:58

## 2024-12-17 RX ADMIN — LIDOCAINE HYDROCHLORIDE 50 MG: 10 INJECTION, SOLUTION EPIDURAL; INFILTRATION; INTRACAUDAL; PERINEURAL at 13:07

## 2024-12-17 RX ADMIN — HYDROCODONE BITARTRATE AND ACETAMINOPHEN 2 TABLET: 5; 325 TABLET ORAL at 20:23

## 2024-12-17 RX ADMIN — ONDANSETRON 4 MG: 2 INJECTION, SOLUTION INTRAMUSCULAR; INTRAVENOUS at 17:36

## 2024-12-17 RX ADMIN — HYDROMORPHONE HYDROCHLORIDE 0.5 MG: 1 INJECTION, SOLUTION INTRAMUSCULAR; INTRAVENOUS; SUBCUTANEOUS at 18:20

## 2024-12-17 RX ADMIN — ROCURONIUM BROMIDE 10 MG: 10 INJECTION, SOLUTION INTRAVENOUS at 15:30

## 2024-12-17 RX ADMIN — ROCURONIUM BROMIDE 10 MG: 10 INJECTION, SOLUTION INTRAVENOUS at 14:12

## 2024-12-17 RX ADMIN — LIDOCAINE HYDROCHLORIDE 1 ML: 10 INJECTION, SOLUTION EPIDURAL; INFILTRATION; INTRACAUDAL; PERINEURAL at 12:16

## 2024-12-17 RX ADMIN — MIDAZOLAM 2 MG: 1 INJECTION INTRAMUSCULAR; INTRAVENOUS at 12:59

## 2024-12-17 RX ADMIN — ROCURONIUM BROMIDE 10 MG: 10 INJECTION, SOLUTION INTRAVENOUS at 16:33

## 2024-12-17 RX ADMIN — SODIUM CHLORIDE: 9 INJECTION, SOLUTION INTRAVENOUS at 12:17

## 2024-12-17 RX ADMIN — Medication 2000 MG: at 17:11

## 2024-12-17 RX ADMIN — GABAPENTIN 300 MG: 300 CAPSULE ORAL at 11:58

## 2024-12-17 RX ADMIN — DEXAMETHASONE SODIUM PHOSPHATE 8 MG: 4 INJECTION, SOLUTION INTRA-ARTICULAR; INTRALESIONAL; INTRAMUSCULAR; INTRAVENOUS; SOFT TISSUE at 13:17

## 2024-12-17 RX ADMIN — ROCURONIUM BROMIDE 20 MG: 10 INJECTION, SOLUTION INTRAVENOUS at 13:44

## 2024-12-17 RX ADMIN — Medication 2000 MG: at 22:04

## 2024-12-17 RX ADMIN — SUGAMMADEX 400 MG: 100 INJECTION, SOLUTION INTRAVENOUS at 17:47

## 2024-12-17 RX ADMIN — ROCURONIUM BROMIDE 50 MG: 10 INJECTION, SOLUTION INTRAVENOUS at 13:07

## 2024-12-17 RX ADMIN — FENTANYL CITRATE 25 MCG: 50 INJECTION INTRAMUSCULAR; INTRAVENOUS at 15:51

## 2024-12-17 RX ADMIN — FENTANYL CITRATE 25 MCG: 50 INJECTION INTRAMUSCULAR; INTRAVENOUS at 14:56

## 2024-12-17 RX ADMIN — ACETAMINOPHEN 1000 MG: 500 TABLET ORAL at 11:58

## 2024-12-17 RX ADMIN — SODIUM CHLORIDE: 9 INJECTION, SOLUTION INTRAVENOUS at 12:20

## 2024-12-17 RX ADMIN — Medication 2000 MG: at 13:17

## 2024-12-17 ASSESSMENT — PAIN SCALES - GENERAL
PAINLEVEL_OUTOF10: 6
PAINLEVEL_OUTOF10: 6
PAINLEVEL_OUTOF10: 7
PAINLEVEL_OUTOF10: 8
PAINLEVEL_OUTOF10: 6
PAINLEVEL_OUTOF10: 2
PAINLEVEL_OUTOF10: 5
PAINLEVEL_OUTOF10: 4

## 2024-12-17 ASSESSMENT — ENCOUNTER SYMPTOMS
GASTROINTESTINAL NEGATIVE: 1
RESPIRATORY NEGATIVE: 1

## 2024-12-17 ASSESSMENT — PAIN DESCRIPTION - LOCATION
LOCATION: ABDOMEN

## 2024-12-17 ASSESSMENT — PAIN DESCRIPTION - ORIENTATION
ORIENTATION: LEFT
ORIENTATION: LEFT

## 2024-12-17 ASSESSMENT — PAIN DESCRIPTION - DESCRIPTORS
DESCRIPTORS: CRAMPING;OTHER (COMMENT)
DESCRIPTORS: SHARP

## 2024-12-17 ASSESSMENT — PAIN - FUNCTIONAL ASSESSMENT
PAIN_FUNCTIONAL_ASSESSMENT: 0-10
PAIN_FUNCTIONAL_ASSESSMENT: 0-10

## 2024-12-17 NOTE — ANESTHESIA PRE PROCEDURE
Department of Anesthesiology  Preprocedure Note       Name:  Alyce Nolasco   Age:  49 y.o.  :  1975                                          MRN:  [id number]         Date:  2024      Surgeon: Surgeon(s):  Jimbo Aly MD    Procedure: Procedure(s):  NEPHROURETERECTOMY LAPAROSCOPIC ROBOTIC XI LEFT    Medications prior to admission:   Prior to Admission medications    Medication Sig Start Date End Date Taking? Authorizing Provider   Multiple Vitamin (DAILY VITAMIN FORMULA PO) Daily Vitamin    Sergei Tapia MD   Multiple Vitamins-Minerals (HAIR SKIN & NAILS ADVANCED PO) Take by mouth    Sergei Tapia MD   Probiotic CHEW Take 1 tablet by mouth daily    Sergei Tapia MD       Current medications:    Current Facility-Administered Medications   Medication Dose Route Frequency Provider Last Rate Last Admin   • enoxaparin (LOVENOX) injection 40 mg  40 mg SubCUTAneous Once Jimbo Aly MD       • sodium chloride flush 0.9 % injection 5-40 mL  5-40 mL IntraVENous 2 times per day Ethan Schofield MD       • sodium chloride flush 0.9 % injection 5-40 mL  5-40 mL IntraVENous PRN Ethan Schofield MD       • 0.9 % sodium chloride infusion   IntraVENous PRN Ethan Schofield MD       • 0.9 % sodium chloride infusion   IntraVENous Continuous Ethan Schofield MD       • lidocaine PF 1 % injection 1 mL  1 mL IntraDERmal Once PRN Ethan Schofield MD       • acetaminophen (TYLENOL) tablet 1,000 mg  1,000 mg Oral Once Ethan Schofield MD       • gabapentin (NEURONTIN) capsule 300 mg  300 mg Oral Once Ethan Schofield MD       • scopolamine (TRANSDERM-SCOP) transdermal patch 1 patch  1 patch TransDERmal Once Mila Kumar MD           Allergies:    Allergies   Allergen Reactions   • Prednisone Shortness Of Breath     High Dose only       Problem List:  There is no problem list on file for this patient.      Past Medical History:        Diagnosis Date   • Anxiety    • Colon polyp    • Cystitis with hematuria    • PONV

## 2024-12-17 NOTE — OP NOTE
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 Item Serial No.  Lot No. LRB No. Used Action   CLIP INT L POLYMER JEANNETTE LIG HEM O JEANNETTE (6EA/PK) - [id number]  CLIP INT L POLYMER JEANNETTE LIG HEM O JEANNETTE (6EA/PK)  [id number]- [id number] Left 2 Implanted         Drains:   Closed/Suction Drain Left LLQ Bulb (Active)       Urinary Catheter 12/17/24 3 Way (Active)       Findings:  Infection Present At Time Of Surgery (PATOS) (choose all levels that have infection present):  No infection present  Other Findings:   Ms. Nolasco is a 49-year-old female who had gross hematuria.  CT scan demonstrated a suspicious mass in the upper pole of the left kidney.  Cystoscopy with ureteroscopy did demonstrate an upper pole transitional cell carcinoma which was biopsy-proven.  All treatment options were discussed preoperatively.  She is here for robotic left nephro ureterectomy    Detailed Description of Procedure:   Patient was brought back to the operating room and laid in the operatively supine position.  Once general esthesia was obtained she was positioned in the left lateral position.  A three-way Quintana catheter had been placed.  All pressure points were

## 2024-12-17 NOTE — H&P
HISTORY and PHYSICAL  [facility]       NAME:  Alyce Nolasco  MRN: [id number]   YOB: 1975   Date: 12/17/2024   Age: 49 y.o.  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  HPI:    Alyce Nolasco is 49 y.o.,  female, has with gross hematuria. CT scan was done which demonstrated a concerning mass possibly in the upper pole the left kidney.  Then she had CYSTOSCOPY RETROGRADE PYELOGRAM LEFT and URETEROSCOPY WITH URETERAL RENAL PELVIC BIOPSY LEFT  per Dr Aly on 11/1/2024. The surgical pathology revealed NON-INVASIVE LOW-GRADE PAPILLARY ILEAL CARCINOMA   Pt denies any blood in the urine, no left flank pain. No nausea,  vomiting or diaphoresis.  No dysuria. No discoloration of the urine,  Pt denies fever/chills, chest pain or SOB       Surgical Pathology Report   Diagnosis --   LEFT RENAL PELVIS, BIOPSY:   -NON-INVASIVE LOW-GRADE PAPILLARY ILEAL CARCINOMA.     Chuy Mcnally M.D.   **Electronically Signed Out**         nimco/11/7/2024       Review of additional significant medical hx:  (See chart for additional detail, including current medications /see ROS for current S/S):     NPO status: pt NPO since the past midnight   Medications taken TODAY (with sip of water): none  Anticoagulation status: none     Denies personal hx of blood clots.  Denies personal hx of MRSA infection.  Denies any personal or family hx of previous complications w/anesthesia.      PAST MEDICAL HISTORY     Past Medical History:   Diagnosis Date    Anxiety     Colon polyp     Cystitis with hematuria     PONV (postoperative nausea and vomiting)     Renal mass     Urinary urgency     Urothelial carcinoma (HCC)        SURGICAL HISTORY       Past Surgical History:   Procedure Laterality Date    CYSTOSCOPY Left 11/4/2024    CYSTOSCOPY

## 2024-12-18 LAB
ANION GAP SERPL CALCULATED.3IONS-SCNC: 10 MMOL/L (ref 9–16)
BUN SERPL-MCNC: 10 MG/DL (ref 6–20)
CALCIUM SERPL-MCNC: 8.1 MG/DL (ref 8.6–10.4)
CHLORIDE SERPL-SCNC: 102 MMOL/L (ref 98–107)
CO2 SERPL-SCNC: 24 MMOL/L (ref 20–31)
CREAT SERPL-MCNC: 1.2 MG/DL (ref 0.7–1.2)
ERYTHROCYTE [DISTWIDTH] IN BLOOD BY AUTOMATED COUNT: 12.2 % (ref 11.5–14.9)
GFR, ESTIMATED: 56 ML/MIN/1.73M2
GLUCOSE SERPL-MCNC: 124 MG/DL (ref 74–99)
HCT VFR BLD AUTO: 34.5 % (ref 36–46)
HGB BLD-MCNC: 11.4 G/DL (ref 12–16)
MCH RBC QN AUTO: 31.1 PG (ref 26–34)
MCHC RBC AUTO-ENTMCNC: 33.1 G/DL (ref 31–37)
MCV RBC AUTO: 93.8 FL (ref 80–100)
MICROORGANISM SPEC CULT: NO GROWTH
PLATELET # BLD AUTO: 357 K/UL (ref 150–450)
PMV BLD AUTO: 8.1 FL (ref 6–12)
POTASSIUM SERPL-SCNC: 3.9 MMOL/L (ref 3.7–5.3)
RBC # BLD AUTO: 3.68 M/UL (ref 4–5.2)
SODIUM SERPL-SCNC: 136 MMOL/L (ref 136–145)
SPECIMEN DESCRIPTION: NORMAL
WBC OTHER # BLD: 12.6 K/UL (ref 3.5–11)

## 2024-12-18 PROCEDURE — 80048 BASIC METABOLIC PNL TOTAL CA: CPT

## 2024-12-18 PROCEDURE — 6360000002 HC RX W HCPCS: Performed by: UROLOGY

## 2024-12-18 PROCEDURE — 6370000000 HC RX 637 (ALT 250 FOR IP): Performed by: UROLOGY

## 2024-12-18 PROCEDURE — G0378 HOSPITAL OBSERVATION PER HR: HCPCS

## 2024-12-18 PROCEDURE — 2500000003 HC RX 250 WO HCPCS: Performed by: UROLOGY

## 2024-12-18 PROCEDURE — 85027 COMPLETE CBC AUTOMATED: CPT

## 2024-12-18 PROCEDURE — 36415 COLL VENOUS BLD VENIPUNCTURE: CPT

## 2024-12-18 RX ADMIN — HYDROCODONE BITARTRATE AND ACETAMINOPHEN 1 TABLET: 5; 325 TABLET ORAL at 09:18

## 2024-12-18 RX ADMIN — ONDANSETRON 4 MG: 4 TABLET, ORALLY DISINTEGRATING ORAL at 13:47

## 2024-12-18 RX ADMIN — HYDROCODONE BITARTRATE AND ACETAMINOPHEN 2 TABLET: 5; 325 TABLET ORAL at 21:35

## 2024-12-18 RX ADMIN — SODIUM CHLORIDE, PRESERVATIVE FREE 10 ML: 5 INJECTION INTRAVENOUS at 21:35

## 2024-12-18 RX ADMIN — Medication 2000 MG: at 13:35

## 2024-12-18 RX ADMIN — Medication 2000 MG: at 21:35

## 2024-12-18 RX ADMIN — Medication 2000 MG: at 06:01

## 2024-12-18 RX ADMIN — MORPHINE SULFATE 4 MG: 2 INJECTION, SOLUTION INTRAMUSCULAR; INTRAVENOUS at 11:39

## 2024-12-18 ASSESSMENT — PAIN DESCRIPTION - LOCATION
LOCATION: SHOULDER;ABDOMEN
LOCATION: SHOULDER;ABDOMEN

## 2024-12-18 ASSESSMENT — PAIN SCALES - GENERAL
PAINLEVEL_OUTOF10: 7
PAINLEVEL_OUTOF10: 8
PAINLEVEL_OUTOF10: 0
PAINLEVEL_OUTOF10: 7

## 2024-12-18 ASSESSMENT — PAIN DESCRIPTION - ORIENTATION
ORIENTATION: RIGHT
ORIENTATION: RIGHT

## 2024-12-18 ASSESSMENT — PAIN DESCRIPTION - DESCRIPTORS
DESCRIPTORS: TIGHTNESS
DESCRIPTORS: SHARP

## 2024-12-18 NOTE — CARE COORDINATION
Case Management Assessment  Initial Evaluation    Date/Time of Evaluation: 12/18/2024 2:24 PM  Assessment Completed by: Vanessa Gastelum RN    If patient is discharged prior to next notation, then this note serves as note for discharge by case management.    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 Yes    Chart Reviewed: Yes      History Provided by: Patient  Patient Orientation: Alert and Oriented    Patient Cognition: Alert    Hospitalization in the last 30 days (Readmission):  No    If yes, Readmission Assessment in CM Navigator will be completed.    Advance Directives:      Code Status: Full Code   Patient's Primary Decision Maker is:        Discharge Planning:    Patient lives with: Spouse/Significant Other Type of Home: House  Primary Care Giver: Self  Patient Support Systems include: Spouse/Significant Other, Children, Family Members   Current Financial resources: None  Current community resources: None  Current services prior to admission: None            Current DME:              Type of Home Care services:  None    ADLS  Prior functional level: Independent in ADLs/IADLs  Current functional level: Independent in ADLs/IADLs    PT AM-PAC:   /24  OT AM-PAC:   /24    Family can provide assistance at DC: Yes  Would you like Case Management to discuss the discharge plan with any other family members/significant others, and if so, who? No  Plans to Return to Present Housing: Yes  Other Identified Issues/Barriers to RETURNING to current housing: no  Potential Assistance needed at discharge: N/A            Potential DME:  n/a  Patient expects to discharge to: House  Plan for

## 2024-12-18 NOTE — PROGRESS NOTES
Patient seen.   Encouraged ambulation, needs to do this tonight.   Encouraged to try solid foods, ok to advance diet.   VIV with sanguinous fluid, minimal output- 25cc.     Will plan to advance activity and diet overnight.   VIV removal likely tomorrow.   Will keep ernandez until Friday with fu for removal at [facility], [place].

## 2024-12-18 NOTE — PROGRESS NOTES
Urology Progress Note    Subjective: Patient did well overnight.  Pain controlled with p.o. medications  Pain is minimal this morning.  She does complain of some shoulder pain, consistent with pain from insufflation  Tolerating clear liquid diet  No nausea  She has not ambulated as yet.    Patient Vitals for the past 24 hrs:   BP Temp Temp src Pulse Resp SpO2 Height Weight   12/18/24 0400 105/65 98.2 °F (36.8 °C) Oral 90 16 96 % -- --   12/18/24 0000 112/64 98 °F (36.7 °C) Oral 84 16 97 % -- --   12/17/24 2053 -- -- -- -- 14 -- -- --   12/17/24 1936 125/73 98.1 °F (36.7 °C) Oral 92 15 96 % -- --   12/17/24 1920 116/71 -- -- 82 13 93 % -- --   12/17/24 1910 117/72 -- -- 84 15 94 % -- --   12/17/24 1900 113/71 97.1 °F (36.2 °C) Infrared 87 10 94 % -- --   12/17/24 1850 118/70 -- -- 90 15 94 % -- --   12/17/24 1840 122/74 -- -- 97 15 93 % -- --   12/17/24 1830 118/72 -- -- 92 14 97 % -- --   12/17/24 1820 121/71 -- -- 95 16 98 % -- --   12/17/24 1810 115/72 -- -- 92 13 99 % -- --   12/17/24 1803 119/71 98.2 °F (36.8 °C) Infrared 90 14 97 % -- --   12/17/24 1800 119/71 -- -- -- -- -- -- --   12/17/24 1144 130/77 97.7 °F (36.5 °C) Infrared 76 16 100 % 1.676 m (5' 6\") 71.2 kg (157 lb)       Intake/Output Summary (Last 24 hours) at 12/18/2024 0809  Last data filed at 12/18/2024 0600  Gross per 24 hour   Intake 1500 ml   Output 1715 ml   Net -215 ml       Recent Labs     12/18/24  0654   WBC 12.6*   HGB 11.4*   HCT 34.5*   MCV 93.8        Recent Labs     12/18/24  0654      K 3.9      CO2 24   BUN 10   CREATININE 1.2       Recent Labs     12/17/24  1501   COLORU Yellow   PHUR 5.5   WBCUA 10 TO 20*   RBCUA 3 to 5*   BACTERIA None   LEUKOCYTESUR NEGATIVE   UROBILINOGEN Normal   BILIRUBINUR NEGATIVE       Additional Lab/culture results:     Physical Exam:   Abdomen is soft and nondistended.  Incisions are clear dry and intact.  VIV drain shows minimal bloody output.  There is scant bleeding noted around

## 2024-12-18 NOTE — ANESTHESIA POSTPROCEDURE EVALUATION
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    Mary Ann Phase I: Mary Ann Score: 9    Mary Ann Phase II:      Anesthesia Post Evaluation    Comments: POST- ANESTHESIA EVALUATION       Pt Name: Alyce Nolasco  MRN: [id number]  YOB: 1975  Date of evaluation: 12/18/2024  Time:  6:58 AM      /65   Pulse 90   Temp 98.2 °F (36.8 °C) (Oral)   Resp 16   Ht 1.676 m (5' 6\")   Wt 71.2 kg (157 lb)   LMP 11/13/2024 (Exact Date) Comment: urine pregnancy negative  SpO2 96%   BMI 25.34 kg/m²      Consciousness Level  Awake  Cardiopulmonary Status  Stable  Pain Adequately Treated YES  Nausea / Vomiting  NO  Adequate Hydration  YES  Anesthesia Related Complications NONE      Electronically signed by Mila Kumar MD on 12/18/2024 at 6:58 AM      No notable events documented.

## 2024-12-18 NOTE — PLAN OF CARE
Problem: Discharge Planning  Goal: Discharge to home or other facility with appropriate resources  Outcome: Progressing  Flowsheets (Taken 12/18/2024 0246)  Discharge to home or other facility with appropriate resources:   Identify barriers to discharge with patient and caregiver   Arrange for needed discharge resources and transportation as appropriate   Identify discharge learning needs (meds, wound care, etc)   Arrange for interpreters to assist at discharge as needed   Refer to discharge planning if patient needs post-hospital services based on physician order or complex needs related to functional status, cognitive ability or social support system     Problem: Pain  Goal: Verbalizes/displays adequate comfort level or baseline comfort level  Outcome: Progressing  Flowsheets (Taken 12/18/2024 0246)  Verbalizes/displays adequate comfort level or baseline comfort level:   Encourage patient to monitor pain and request assistance   Assess pain using appropriate pain scale   Administer analgesics based on type and severity of pain and evaluate response   Implement non-pharmacological measures as appropriate and evaluate response   Consider cultural and social influences on pain and pain management   Notify Licensed Independent Practitioner if interventions unsuccessful or patient reports new pain     Problem: Safety - Adult  Goal: Free from fall injury  Outcome: Progressing  Flowsheets (Taken 12/18/2024 0246)  Free From Fall Injury: Instruct family/caregiver on patient safety     Problem: ABCDS Injury Assessment  Goal: Absence of physical injury  Outcome: Progressing  Flowsheets (Taken 12/18/2024 0246)  Absence of Physical Injury: Implement safety measures based on patient assessment

## 2024-12-19 LAB
ANION GAP SERPL CALCULATED.3IONS-SCNC: 8 MMOL/L (ref 9–16)
BASOPHILS # BLD: 0 K/UL (ref 0–0.2)
BASOPHILS NFR BLD: 0 % (ref 0–2)
BUN SERPL-MCNC: 7 MG/DL (ref 6–20)
CALCIUM SERPL-MCNC: 8.5 MG/DL (ref 8.6–10.4)
CHLORIDE SERPL-SCNC: 100 MMOL/L (ref 98–107)
CO2 SERPL-SCNC: 25 MMOL/L (ref 20–31)
CREAT SERPL-MCNC: 1 MG/DL (ref 0.7–1.2)
EOSINOPHIL # BLD: 0 K/UL (ref 0–0.4)
EOSINOPHILS RELATIVE PERCENT: 0 % (ref 0–4)
ERYTHROCYTE [DISTWIDTH] IN BLOOD BY AUTOMATED COUNT: 11.8 % (ref 11.5–14.9)
GFR, ESTIMATED: 69 ML/MIN/1.73M2
GLUCOSE SERPL-MCNC: 152 MG/DL (ref 74–99)
HCT VFR BLD AUTO: 33 % (ref 36–46)
HGB BLD-MCNC: 11.1 G/DL (ref 12–16)
LYMPHOCYTES NFR BLD: 0.78 K/UL (ref 1–4.8)
LYMPHOCYTES RELATIVE PERCENT: 5 % (ref 24–44)
MCH RBC QN AUTO: 31.4 PG (ref 26–34)
MCHC RBC AUTO-ENTMCNC: 33.7 G/DL (ref 31–37)
MCV RBC AUTO: 93.4 FL (ref 80–100)
MONOCYTES NFR BLD: 1.09 K/UL (ref 0.1–1.3)
MONOCYTES NFR BLD: 7 % (ref 1–7)
MORPHOLOGY: NORMAL
NEUTROPHILS NFR BLD: 88 % (ref 36–66)
NEUTS SEG NFR BLD: 13.73 K/UL (ref 1.3–9.1)
PLATELET # BLD AUTO: 321 K/UL (ref 150–450)
PMV BLD AUTO: 7.5 FL (ref 6–12)
POTASSIUM SERPL-SCNC: 3.7 MMOL/L (ref 3.7–5.3)
RBC # BLD AUTO: 3.53 M/UL (ref 4–5.2)
SODIUM SERPL-SCNC: 133 MMOL/L (ref 136–145)
WBC OTHER # BLD: 15.6 K/UL (ref 3.5–11)

## 2024-12-19 PROCEDURE — 80048 BASIC METABOLIC PNL TOTAL CA: CPT

## 2024-12-19 PROCEDURE — 36415 COLL VENOUS BLD VENIPUNCTURE: CPT

## 2024-12-19 PROCEDURE — 6370000000 HC RX 637 (ALT 250 FOR IP): Performed by: NURSE PRACTITIONER

## 2024-12-19 PROCEDURE — 6370000000 HC RX 637 (ALT 250 FOR IP): Performed by: UROLOGY

## 2024-12-19 PROCEDURE — 85025 COMPLETE CBC W/AUTO DIFF WBC: CPT

## 2024-12-19 PROCEDURE — G0378 HOSPITAL OBSERVATION PER HR: HCPCS

## 2024-12-19 RX ORDER — DOCUSATE SODIUM 100 MG/1
100 CAPSULE, LIQUID FILLED ORAL 2 TIMES DAILY PRN
Status: DISCONTINUED | OUTPATIENT
Start: 2024-12-19 | End: 2024-12-20 | Stop reason: HOSPADM

## 2024-12-19 RX ADMIN — HYDROCODONE BITARTRATE AND ACETAMINOPHEN 2 TABLET: 5; 325 TABLET ORAL at 16:00

## 2024-12-19 RX ADMIN — ONDANSETRON 4 MG: 4 TABLET, ORALLY DISINTEGRATING ORAL at 09:57

## 2024-12-19 RX ADMIN — HYDROCODONE BITARTRATE AND ACETAMINOPHEN 2 TABLET: 5; 325 TABLET ORAL at 09:57

## 2024-12-19 RX ADMIN — DOCUSATE SODIUM 100 MG: 100 CAPSULE, LIQUID FILLED ORAL at 20:02

## 2024-12-19 ASSESSMENT — PAIN DESCRIPTION - ORIENTATION
ORIENTATION: RIGHT
ORIENTATION: LEFT
ORIENTATION: LEFT

## 2024-12-19 ASSESSMENT — PAIN SCALES - GENERAL
PAINLEVEL_OUTOF10: 6
PAINLEVEL_OUTOF10: 4
PAINLEVEL_OUTOF10: 2

## 2024-12-19 ASSESSMENT — PAIN DESCRIPTION - LOCATION
LOCATION: ABDOMEN

## 2024-12-19 ASSESSMENT — PAIN DESCRIPTION - DESCRIPTORS
DESCRIPTORS: DISCOMFORT
DESCRIPTORS: PRESSURE
DESCRIPTORS: SHARP;CRAMPING

## 2024-12-19 ASSESSMENT — PAIN DESCRIPTION - PAIN TYPE: TYPE: ACUTE PAIN

## 2024-12-19 ASSESSMENT — PAIN - FUNCTIONAL ASSESSMENT: PAIN_FUNCTIONAL_ASSESSMENT: ACTIVITIES ARE NOT PREVENTED

## 2024-12-19 NOTE — CARE COORDINATION
ONGOING DISCHARGE PLAN:    Patient is alert and oriented x4.    Spoke with patient regarding discharge plan and patient confirms that plan is still home without needs. VNS has been declined.    POD#2 Lap robotic left nephroureterectomy. Regular diet.    Will continue to follow for additional discharge needs.    If patient is discharged prior to next notation, then this note serves as note for discharge by case management.    Electronically signed by Vanessa Gastelum RN on 12/19/2024 at 12:14 PM

## 2024-12-19 NOTE — PLAN OF CARE
Problem: Discharge Planning  Goal: Discharge to home or other facility with appropriate resources  12/19/2024 1738 by Ann Grey RN  Outcome: Progressing  12/19/2024 0507 by Schober, Robyn L, RN  Outcome: Progressing  Flowsheets (Taken 12/18/2024 2128)  Discharge to home or other facility with appropriate resources: Identify barriers to discharge with patient and caregiver     Problem: Pain  Goal: Verbalizes/displays adequate comfort level or baseline comfort level  12/19/2024 1738 by Ann Grey RN  Outcome: Progressing  12/19/2024 0507 by Schober, Robyn L, RN  Outcome: Progressing     Problem: Safety - Adult  Goal: Free from fall injury  12/19/2024 1738 by Ann Grey RN  Outcome: Progressing  Flowsheets  Taken 12/19/2024 1736 by Ann Grey RN  Free From Fall Injury: Based on caregiver fall risk screen, instruct family/caregiver to ask for assistance with transferring infant if caregiver noted to have fall risk factors  Taken 12/19/2024 0508 by Schober, Robyn L, RN  Free From Fall Injury: Based on caregiver fall risk screen, instruct family/caregiver to ask for assistance with transferring infant if caregiver noted to have fall risk factors  12/19/2024 0507 by Schober, Robyn L, RN  Outcome: Progressing     Problem: ABCDS Injury Assessment  Goal: Absence of physical injury  12/19/2024 1738 by Ann Grey RN  Outcome: Progressing  Flowsheets  Taken 12/19/2024 1736 by Ann Grey RN  Absence of Physical Injury: Implement safety measures based on patient assessment  Taken 12/19/2024 0508 by Schober, Robyn L, RN  Absence of Physical Injury: Implement safety measures based on patient assessment  12/19/2024 0507 by Schober, Robyn L, RN  Outcome: Progressing

## 2024-12-19 NOTE — PROGRESS NOTES
Patient is doing much better this afternoon.  Her pain is much improved after VIV drain removal.  She is tolerating regular diet.  She is ambulating better.  Will monitor overnight.  SHAHEEN Lunsford in the morning.  Likely home tomorrow

## 2024-12-19 NOTE — PROGRESS NOTES
Department of Urology  Urology Progress Note    Patient:  Alyce Nolasco  MRN: [id number]  YOB: 1975    Subjective:  No major events overnight.   AFVSS, labs reviewed, cr normal.  Doing fairly well this morning.   No incisional pain.  Biggest complaint is shoulder pain.   She did get out of bed a couple times, has not ambulated in hallway.  Tolerating solids, just has a poor appetite.  She is not passing gas, no BM.  Urine is clear and yellow, ernandez draining.   VIV output is stable.      Patient Vitals for the past 24 hrs:   BP Temp Temp src Pulse Resp SpO2   12/19/24 0800 125/74 100 °F (37.8 °C) Oral (!) 114 18 96 %   12/19/24 0430 116/70 99.8 °F (37.7 °C) Oral (!) 110 18 96 %   12/18/24 2000 116/69 99.1 °F (37.3 °C) Oral (!) 114 18 96 %   12/18/24 1638 114/71 99.1 °F (37.3 °C) Oral 71 16 96 %   12/18/24 1230 120/67 97.9 °F (36.6 °C) -- 97 16 96 %       Intake/Output Summary (Last 24 hours) at 12/19/2024 1035  Last data filed at 12/19/2024 1006  Gross per 24 hour   Intake 850 ml   Output 2480 ml   Net -1630 ml       Recent Labs     12/18/24  0654 12/19/24  0836   WBC 12.6* 15.6*   HGB 11.4* 11.1*   HCT 34.5* 33.0*   MCV 93.8 93.4    321     Recent Labs     12/18/24  0654 12/19/24  0836    133*   K 3.9 3.7    100   CO2 24 25   BUN 10 7   CREATININE 1.2 1.0       Recent Labs     12/17/24  1501   COLORU Yellow   PHUR 5.5   WBCUA 10 TO 20*   RBCUA 3 to 5*   BACTERIA None   LEUKOCYTESUR NEGATIVE   UROBILINOGEN Normal   BILIRUBINUR NEGATIVE       Additional Lab/culture results:    Physical Exam:  Constitutional: Patient in no acute distress;   Neuro: alert and oriented to person place and time.    Psych: Mood and affect normal.  Skin: Normal  Lungs: Respiratory effort normal  Cardiovascular:  Normal peripheral pulses  Abdomen: Soft,  non-distended, tenderness over incisions.   Bladder non-tender and not distended.  Ernanedz in place, urine clear and yellow.  EPC in place and on.     LLQ VIV

## 2024-12-20 VITALS
DIASTOLIC BLOOD PRESSURE: 70 MMHG | WEIGHT: 157 LBS | OXYGEN SATURATION: 96 % | SYSTOLIC BLOOD PRESSURE: 115 MMHG | HEIGHT: 66 IN | HEART RATE: 93 BPM | BODY MASS INDEX: 25.23 KG/M2 | TEMPERATURE: 98.2 F | RESPIRATION RATE: 18 BRPM

## 2024-12-20 LAB
ERYTHROCYTE [DISTWIDTH] IN BLOOD BY AUTOMATED COUNT: 12 % (ref 11.5–14.9)
HCT VFR BLD AUTO: 33.4 % (ref 36–46)
HGB BLD-MCNC: 11.1 G/DL (ref 12–16)
MCH RBC QN AUTO: 31.1 PG (ref 26–34)
MCHC RBC AUTO-ENTMCNC: 33.3 G/DL (ref 31–37)
MCV RBC AUTO: 93.4 FL (ref 80–100)
PLATELET # BLD AUTO: 333 K/UL (ref 150–450)
PMV BLD AUTO: 7.4 FL (ref 6–12)
RBC # BLD AUTO: 3.58 M/UL (ref 4–5.2)
SURGICAL PATHOLOGY REPORT: NORMAL
WBC OTHER # BLD: 14 K/UL (ref 3.5–11)

## 2024-12-20 PROCEDURE — 6370000000 HC RX 637 (ALT 250 FOR IP): Performed by: UROLOGY

## 2024-12-20 PROCEDURE — 2500000003 HC RX 250 WO HCPCS: Performed by: UROLOGY

## 2024-12-20 PROCEDURE — 85027 COMPLETE CBC AUTOMATED: CPT

## 2024-12-20 PROCEDURE — 6360000002 HC RX W HCPCS: Performed by: UROLOGY

## 2024-12-20 PROCEDURE — 36415 COLL VENOUS BLD VENIPUNCTURE: CPT

## 2024-12-20 PROCEDURE — G0378 HOSPITAL OBSERVATION PER HR: HCPCS

## 2024-12-20 PROCEDURE — 6370000000 HC RX 637 (ALT 250 FOR IP): Performed by: NURSE PRACTITIONER

## 2024-12-20 RX ORDER — CIPROFLOXACIN 2 MG/ML
400 INJECTION, SOLUTION INTRAVENOUS EVERY 12 HOURS
Status: DISCONTINUED | OUTPATIENT
Start: 2024-12-20 | End: 2024-12-20 | Stop reason: HOSPADM

## 2024-12-20 RX ORDER — ACETAMINOPHEN 325 MG/1
650 TABLET ORAL EVERY 6 HOURS PRN
Status: DISCONTINUED | OUTPATIENT
Start: 2024-12-20 | End: 2024-12-20

## 2024-12-20 RX ORDER — CIPROFLOXACIN 2 MG/ML
400 INJECTION, SOLUTION INTRAVENOUS
Status: DISCONTINUED | OUTPATIENT
Start: 2024-12-20 | End: 2024-12-20

## 2024-12-20 RX ORDER — ACETAMINOPHEN 500 MG
500 TABLET ORAL EVERY 6 HOURS PRN
Status: DISCONTINUED | OUTPATIENT
Start: 2024-12-20 | End: 2024-12-20 | Stop reason: HOSPADM

## 2024-12-20 RX ORDER — CIPROFLOXACIN 500 MG/1
500 TABLET, FILM COATED ORAL 2 TIMES DAILY
Qty: 10 TABLET | Refills: 0 | Status: SHIPPED | OUTPATIENT
Start: 2024-12-20 | End: 2024-12-25

## 2024-12-20 RX ADMIN — CIPROFLOXACIN 400 MG: 2 INJECTION, SOLUTION INTRAVENOUS at 10:15

## 2024-12-20 RX ADMIN — ACETAMINOPHEN 650 MG: 325 TABLET ORAL at 01:29

## 2024-12-20 RX ADMIN — HYDROCODONE BITARTRATE AND ACETAMINOPHEN 1 TABLET: 5; 325 TABLET ORAL at 01:31

## 2024-12-20 RX ADMIN — DOCUSATE SODIUM 100 MG: 100 CAPSULE, LIQUID FILLED ORAL at 10:02

## 2024-12-20 RX ADMIN — ACETAMINOPHEN 500 MG: 500 TABLET ORAL at 10:01

## 2024-12-20 RX ADMIN — SODIUM CHLORIDE, PRESERVATIVE FREE 10 ML: 5 INJECTION INTRAVENOUS at 10:03

## 2024-12-20 ASSESSMENT — PAIN SCALES - GENERAL
PAINLEVEL_OUTOF10: 0
PAINLEVEL_OUTOF10: 3
PAINLEVEL_OUTOF10: 2

## 2024-12-20 ASSESSMENT — PAIN DESCRIPTION - LOCATION: LOCATION: ABDOMEN

## 2024-12-20 ASSESSMENT — PAIN DESCRIPTION - DESCRIPTORS
DESCRIPTORS: ACHING;SORE
DESCRIPTORS: DULL

## 2024-12-20 ASSESSMENT — PAIN DESCRIPTION - ORIENTATION: ORIENTATION: LEFT;LOWER

## 2024-12-20 NOTE — PLAN OF CARE
Problem: Discharge Planning  Goal: Discharge to home or other facility with appropriate resources  12/20/2024 0228 by Clinton Cruz RN  Outcome: Progressing  Flowsheets (Taken 12/19/2024 2003)  Discharge to home or other facility with appropriate resources: Identify barriers to discharge with patient and caregiver     Problem: Pain  Goal: Verbalizes/displays adequate comfort level or baseline comfort level  12/20/2024 0228 by Clinton Cruz RN  Outcome: Progressing  Flowsheets (Taken 12/20/2024 0201)  Verbalizes/displays adequate comfort level or baseline comfort level:   Encourage patient to monitor pain and request assistance   Administer analgesics based on type and severity of pain and evaluate response     Problem: Safety - Adult  Goal: Free from fall injury  12/20/2024 0228 by Clinton Cruz RN  Outcome: Progressing  Flowsheets (Taken 12/19/2024 2110)  Free From Fall Injury: Instruct family/caregiver on patient safety     Problem: ABCDS Injury Assessment  Goal: Absence of physical injury  12/20/2024 0228 by Clinton Cruz RN  Outcome: Progressing  Flowsheets (Taken 12/19/2024 2110)  Absence of Physical Injury: Implement safety measures based on patient assessment     Problem: Skin/Tissue Integrity - Adult  Goal: Incisions, wounds, or drain sites healing without S/S of infection  Outcome: Progressing  Flowsheets (Taken 12/19/2024 2003)  Incisions, Wounds, or Drain Sites Healing Without Sign and Symptoms of Infection: TWICE DAILY: Assess and document dressing/incision, wound bed, drain sites and surrounding tissue     Problem: Gastrointestinal - Adult  Goal: Maintains or returns to baseline bowel function  Outcome: Progressing  Flowsheets (Taken 12/19/2024 2003)  Maintains or returns to baseline bowel function:   Assess bowel function   Administer ordered medications as needed     Problem: Genitourinary - Adult  Goal: Urinary catheter remains patent  Outcome: Progressing  Flowsheets (Taken 12/19/2024

## 2024-12-20 NOTE — PLAN OF CARE
Problem: Discharge Planning  Goal: Discharge to home or other facility with appropriate resources  12/20/2024 1554 by Miguelina Thompson RN  Outcome: Completed     Problem: Pain  Goal: Verbalizes/displays adequate comfort level or baseline comfort level  12/20/2024 1554 by Miguelina Thompson RN  Outcome: Completed     Problem: Safety - Adult  Goal: Free from fall injury  12/20/2024 1554 by Miguelina Thompson RN  Outcome: Completed     Problem: ABCDS Injury Assessment  Goal: Absence of physical injury  12/20/2024 1554 by Miguelina Thompson RN  Outcome: Completed     Problem: Skin/Tissue Integrity - Adult  Goal: Incisions, wounds, or drain sites healing without S/S of infection  12/20/2024 1554 by Miguelina Thompson RN  Outcome: Completed     Problem: Gastrointestinal - Adult  Goal: Maintains or returns to baseline bowel function  12/20/2024 1554 by Miguelina Thompson RN  Outcome: Completed     Problem: Genitourinary - Adult  Goal: Urinary catheter remains patent  12/20/2024 1554 by Miguelina Thompson RN  Outcome: Completed     Problem: Infection - Adult  Goal: Absence of infection at discharge  12/20/2024 1554 by Miguelina Thompson RN  Outcome: Completed

## 2024-12-20 NOTE — PROGRESS NOTES
Patient d/c'd home with her  states they understands all d/c orders. Patient was taken downstairs to car by writer in wheelchair.

## 2024-12-20 NOTE — PLAN OF CARE
Problem: Discharge Planning  Goal: Discharge to home or other facility with appropriate resources  12/20/2024 1257 by Miguelina Thompson RN  Outcome: Progressing     Problem: Pain  Goal: Verbalizes/displays adequate comfort level or baseline comfort level  12/20/2024 1257 by Miguelina Thompson RN  Outcome: Progressing     Problem: Safety - Adult  Goal: Free from fall injury  12/20/2024 1257 by Miguelina Thmopson RN  Outcome: Progressing     Problem: ABCDS Injury Assessment  Goal: Absence of physical injury  12/20/2024 1257 by Miguelina Thompson RN  Outcome: Progressing     Problem: Skin/Tissue Integrity - Adult  Goal: Incisions, wounds, or drain sites healing without S/S of infection  12/20/2024 1257 by Miguelina Thompson RN  Outcome: Progressing     Problem: Gastrointestinal - Adult  Goal: Maintains or returns to baseline bowel function  12/20/2024 1257 by Miguelina Thompson RN  Outcome: Progressing     Problem: Genitourinary - Adult  Goal: Urinary catheter remains patent  12/20/2024 1257 by Miguelina Thompson RN  Outcome: Progressing     Problem: Infection - Adult  Goal: Absence of infection at discharge  12/20/2024 1257 by Miguelina Thompson RN  Outcome: Progressing

## 2024-12-20 NOTE — PROGRESS NOTES
Writer notified Dr. Murphy of pt temp 101. PRN tylenol currently not ordered. Order received for tylenol PRN.

## 2024-12-20 NOTE — CARE COORDINATION
ONGOING DISCHARGE PLAN:    Patient is alert and oriented x4.    Spoke with patient regarding discharge plan and patient confirms that plan is still home without needs. VNS has been declined.    POD#3 left nephroureterectomy. Regular diet. Possible d/c this afternoon.    Will continue to follow for additional discharge needs.    If patient is discharged prior to next notation, then this note serves as note for discharge by case management.    Electronically signed by Vanessa Gastelum RN on 12/20/2024 at 11:33 AM

## 2024-12-20 NOTE — PROGRESS NOTES
[facility] Urology  Nestor Monahan MD FACS    Urology Progress Note    Subjective:   Patient feels good this morning.  Pain is well-controlled.  Tolerating regular diet.  Ambulating without any issues.  She did have a fever of 38.3 last night.  She does report some crackling in her lungs when she takes deep breaths.        Patient Vitals for the past 24 hrs:   BP Temp Temp src Pulse Resp SpO2   12/20/24 0615 115/70 98.2 °F (36.8 °C) Oral 93 18 96 %   12/20/24 0215 -- 98.9 °F (37.2 °C) Oral -- -- --   12/20/24 0201 -- -- -- -- 16 --   12/20/24 0115 125/68 (!) 101 °F (38.3 °C) Oral (!) 118 17 95 %   12/19/24 1630 -- -- -- -- 18 --   12/19/24 1607 118/77 99.9 °F (37.7 °C) Oral (!) 120 18 96 %   12/19/24 1232 107/70 98.9 °F (37.2 °C) Oral (!) 103 18 96 %   12/19/24 0800 125/74 100 °F (37.8 °C) Oral (!) 114 18 96 %       Intake/Output Summary (Last 24 hours) at 12/20/2024 0753  Last data filed at 12/20/2024 0734  Gross per 24 hour   Intake 1650 ml   Output 3150 ml   Net -1500 ml       Recent Labs     12/18/24  0654 12/19/24  0836   WBC 12.6* 15.6*   HGB 11.4* 11.1*   HCT 34.5* 33.0*   MCV 93.8 93.4    321     Recent Labs     12/18/24  0654 12/19/24  0836    133*   K 3.9 3.7    100   CO2 24 25   BUN 10 7   CREATININE 1.2 1.0       Recent Labs     12/17/24  1501   COLORU Yellow   PHUR 5.5   WBCUA 10 TO 20*   RBCUA 3 to 5*   BACTERIA None   LEUKOCYTESUR NEGATIVE   UROBILINOGEN Normal   BILIRUBINUR NEGATIVE       Additional Lab/culture results:     Physical Exam:     Abdomen is soft and nondistended.  No significant tenderness.  Wounds are clear dry and intact.  Urine was yellow and clear.  No calf tenderness or erythema.    Interval Imaging Findings: None    Impression:    Patient Active Problem List   Diagnosis    Renal mass       Plan:     49-year-old female status post robotic left nephro ureterectomy postoperative day 3.  Clinically she is doing quite well.  She did have a fever

## 2024-12-24 ENCOUNTER — APPOINTMENT (OUTPATIENT)
Dept: GENERAL RADIOLOGY | Age: 49
DRG: 920 | End: 2024-12-24
Payer: COMMERCIAL

## 2024-12-24 ENCOUNTER — HOSPITAL ENCOUNTER (INPATIENT)
Age: 49
LOS: 3 days | Discharge: HOME | DRG: 920 | End: 2024-12-27
Attending: EMERGENCY MEDICINE | Admitting: HOSPITALIST
Payer: COMMERCIAL

## 2024-12-24 ENCOUNTER — APPOINTMENT (OUTPATIENT)
Dept: CT IMAGING | Age: 49
DRG: 920 | End: 2024-12-24
Payer: COMMERCIAL

## 2024-12-24 DIAGNOSIS — T81.40XA INTRA-ABDOMINAL INFECTION AFTER SURGICAL PROCEDURE: Primary | ICD-10-CM

## 2024-12-24 PROBLEM — N39.0 COMPLICATED UTI (URINARY TRACT INFECTION): Status: ACTIVE | Noted: 2024-12-24

## 2024-12-24 LAB
ALBUMIN SERPL-MCNC: 4 G/DL (ref 3.5–5.2)
ALBUMIN/GLOB SERPL: 1.1 {RATIO} (ref 1–2.5)
ALP SERPL-CCNC: 69 U/L (ref 35–104)
ALT SERPL-CCNC: 9 U/L (ref 5–33)
ANION GAP SERPL CALCULATED.3IONS-SCNC: 13 MMOL/L (ref 9–17)
AST SERPL-CCNC: 15 U/L
BASOPHILS # BLD: 0 K/UL (ref 0–0.2)
BASOPHILS NFR BLD: 1 % (ref 0–2)
BILIRUB SERPL-MCNC: 0.4 MG/DL (ref 0.3–1.2)
BILIRUB UR QL STRIP: NEGATIVE
BUN SERPL-MCNC: 11 MG/DL (ref 6–20)
CALCIUM SERPL-MCNC: 9.5 MG/DL (ref 8.6–10.4)
CHLORIDE SERPL-SCNC: 96 MMOL/L (ref 98–107)
CLARITY UR: CLEAR
CO2 SERPL-SCNC: 27 MMOL/L (ref 20–31)
COLOR UR: YELLOW
COMMENT: NORMAL
CREAT SERPL-MCNC: 1.1 MG/DL (ref 0.5–0.9)
EOSINOPHIL # BLD: 0.1 K/UL (ref 0–0.4)
EOSINOPHILS RELATIVE PERCENT: 2 % (ref 1–4)
ERYTHROCYTE [DISTWIDTH] IN BLOOD BY AUTOMATED COUNT: 12.2 % (ref 12.5–15.4)
GFR, ESTIMATED: 62 ML/MIN/1.73M2
GLUCOSE SERPL-MCNC: 105 MG/DL (ref 70–99)
GLUCOSE UR STRIP-MCNC: NEGATIVE MG/DL
HCG SERPL QL: NEGATIVE
HCT VFR BLD AUTO: 34.7 % (ref 36–46)
HGB BLD-MCNC: 11.6 G/DL (ref 12–16)
HGB UR QL STRIP.AUTO: NEGATIVE
KETONES UR STRIP-MCNC: NEGATIVE MG/DL
LACTATE BLDV-SCNC: 0.9 MMOL/L (ref 0.5–2.2)
LEUKOCYTE ESTERASE UR QL STRIP: NEGATIVE
LIPASE SERPL-CCNC: 35 U/L (ref 13–60)
LYMPHOCYTES NFR BLD: 1.1 K/UL (ref 1–4.8)
LYMPHOCYTES RELATIVE PERCENT: 13 % (ref 24–44)
MCH RBC QN AUTO: 30.7 PG (ref 26–34)
MCHC RBC AUTO-ENTMCNC: 33.4 G/DL (ref 31–37)
MCV RBC AUTO: 91.9 FL (ref 80–100)
MONOCYTES NFR BLD: 0.7 K/UL (ref 0.1–1.2)
MONOCYTES NFR BLD: 9 % (ref 2–11)
NEUTROPHILS NFR BLD: 75 % (ref 36–66)
NEUTS SEG NFR BLD: 6.4 K/UL (ref 1.8–7.7)
NITRITE UR QL STRIP: NEGATIVE
PH UR STRIP: 6.5 [PH] (ref 5–8)
PLATELET # BLD AUTO: 409 K/UL (ref 140–450)
PMV BLD AUTO: 6.9 FL (ref 6–12)
POTASSIUM SERPL-SCNC: 3.8 MMOL/L (ref 3.7–5.3)
PROT SERPL-MCNC: 7.6 G/DL (ref 6.4–8.3)
PROT UR STRIP-MCNC: NEGATIVE MG/DL
RBC # BLD AUTO: 3.78 M/UL (ref 4–5.2)
SARS-COV-2 RDRP RESP QL NAA+PROBE: NOT DETECTED
SODIUM SERPL-SCNC: 136 MMOL/L (ref 135–144)
SP GR UR STRIP: 1.01 (ref 1–1.03)
SPECIMEN DESCRIPTION: NORMAL
UROBILINOGEN UR STRIP-ACNC: NORMAL EU/DL (ref 0–1)
WBC OTHER # BLD: 8.4 K/UL (ref 3.5–11)

## 2024-12-24 PROCEDURE — 99222 1ST HOSP IP/OBS MODERATE 55: CPT | Performed by: HOSPITALIST

## 2024-12-24 PROCEDURE — 87040 BLOOD CULTURE FOR BACTERIA: CPT

## 2024-12-24 PROCEDURE — 83605 ASSAY OF LACTIC ACID: CPT

## 2024-12-24 PROCEDURE — 87635 SARS-COV-2 COVID-19 AMP PRB: CPT

## 2024-12-24 PROCEDURE — 2500000003 HC RX 250 WO HCPCS: Performed by: PHYSICIAN ASSISTANT

## 2024-12-24 PROCEDURE — 85025 COMPLETE CBC W/AUTO DIFF WBC: CPT

## 2024-12-24 PROCEDURE — 1200000000 HC SEMI PRIVATE

## 2024-12-24 PROCEDURE — 2580000003 HC RX 258: Performed by: PHYSICIAN ASSISTANT

## 2024-12-24 PROCEDURE — 6360000004 HC RX CONTRAST MEDICATION: Performed by: PHYSICIAN ASSISTANT

## 2024-12-24 PROCEDURE — 2580000003 HC RX 258: Performed by: HOSPITALIST

## 2024-12-24 PROCEDURE — 6370000000 HC RX 637 (ALT 250 FOR IP): Performed by: HOSPITALIST

## 2024-12-24 PROCEDURE — 99285 EMERGENCY DEPT VISIT HI MDM: CPT

## 2024-12-24 PROCEDURE — 36415 COLL VENOUS BLD VENIPUNCTURE: CPT

## 2024-12-24 PROCEDURE — 84703 CHORIONIC GONADOTROPIN ASSAY: CPT

## 2024-12-24 PROCEDURE — 6360000002 HC RX W HCPCS: Performed by: HOSPITALIST

## 2024-12-24 PROCEDURE — 80053 COMPREHEN METABOLIC PANEL: CPT

## 2024-12-24 PROCEDURE — 74177 CT ABD & PELVIS W/CONTRAST: CPT

## 2024-12-24 PROCEDURE — 81003 URINALYSIS AUTO W/O SCOPE: CPT

## 2024-12-24 PROCEDURE — 6360000002 HC RX W HCPCS: Performed by: PHYSICIAN ASSISTANT

## 2024-12-24 PROCEDURE — 83690 ASSAY OF LIPASE: CPT

## 2024-12-24 PROCEDURE — 71046 X-RAY EXAM CHEST 2 VIEWS: CPT

## 2024-12-24 RX ORDER — HYDROCODONE BITARTRATE AND ACETAMINOPHEN 5; 325 MG/1; MG/1
2 TABLET ORAL EVERY 4 HOURS PRN
Status: DISCONTINUED | OUTPATIENT
Start: 2024-12-24 | End: 2024-12-27 | Stop reason: HOSPADM

## 2024-12-24 RX ORDER — SODIUM CHLORIDE 0.9 % (FLUSH) 0.9 %
5-40 SYRINGE (ML) INJECTION PRN
Status: DISCONTINUED | OUTPATIENT
Start: 2024-12-24 | End: 2024-12-27 | Stop reason: HOSPADM

## 2024-12-24 RX ORDER — IOPAMIDOL 755 MG/ML
75 INJECTION, SOLUTION INTRAVASCULAR
Status: COMPLETED | OUTPATIENT
Start: 2024-12-24 | End: 2024-12-24

## 2024-12-24 RX ORDER — ONDANSETRON 4 MG/1
4 TABLET, ORALLY DISINTEGRATING ORAL EVERY 8 HOURS PRN
Status: DISCONTINUED | OUTPATIENT
Start: 2024-12-24 | End: 2024-12-27 | Stop reason: HOSPADM

## 2024-12-24 RX ORDER — 0.9 % SODIUM CHLORIDE 0.9 %
1000 INTRAVENOUS SOLUTION INTRAVENOUS ONCE
Status: COMPLETED | OUTPATIENT
Start: 2024-12-24 | End: 2024-12-24

## 2024-12-24 RX ORDER — ACETAMINOPHEN 325 MG/1
650 TABLET ORAL EVERY 6 HOURS PRN
Status: DISCONTINUED | OUTPATIENT
Start: 2024-12-24 | End: 2024-12-27 | Stop reason: HOSPADM

## 2024-12-24 RX ORDER — ONDANSETRON 2 MG/ML
4 INJECTION INTRAMUSCULAR; INTRAVENOUS EVERY 6 HOURS PRN
Status: DISCONTINUED | OUTPATIENT
Start: 2024-12-24 | End: 2024-12-27 | Stop reason: HOSPADM

## 2024-12-24 RX ORDER — HYDROCODONE BITARTRATE AND ACETAMINOPHEN 5; 325 MG/1; MG/1
1 TABLET ORAL EVERY 4 HOURS PRN
Status: DISCONTINUED | OUTPATIENT
Start: 2024-12-24 | End: 2024-12-27 | Stop reason: HOSPADM

## 2024-12-24 RX ORDER — 0.9 % SODIUM CHLORIDE 0.9 %
80 INTRAVENOUS SOLUTION INTRAVENOUS ONCE
Status: DISCONTINUED | OUTPATIENT
Start: 2024-12-24 | End: 2024-12-27 | Stop reason: HOSPADM

## 2024-12-24 RX ORDER — SODIUM CHLORIDE 0.9 % (FLUSH) 0.9 %
10 SYRINGE (ML) INJECTION ONCE
Status: COMPLETED | OUTPATIENT
Start: 2024-12-24 | End: 2024-12-24

## 2024-12-24 RX ORDER — SODIUM CHLORIDE 0.9 % (FLUSH) 0.9 %
5-40 SYRINGE (ML) INJECTION EVERY 12 HOURS SCHEDULED
Status: DISCONTINUED | OUTPATIENT
Start: 2024-12-24 | End: 2024-12-27 | Stop reason: HOSPADM

## 2024-12-24 RX ORDER — ACETAMINOPHEN 650 MG/1
650 SUPPOSITORY RECTAL EVERY 6 HOURS PRN
Status: DISCONTINUED | OUTPATIENT
Start: 2024-12-24 | End: 2024-12-27 | Stop reason: HOSPADM

## 2024-12-24 RX ORDER — SODIUM CHLORIDE 9 MG/ML
INJECTION, SOLUTION INTRAVENOUS PRN
Status: DISCONTINUED | OUTPATIENT
Start: 2024-12-24 | End: 2024-12-27 | Stop reason: HOSPADM

## 2024-12-24 RX ORDER — POLYETHYLENE GLYCOL 3350 17 G/17G
17 POWDER, FOR SOLUTION ORAL DAILY PRN
Status: DISCONTINUED | OUTPATIENT
Start: 2024-12-24 | End: 2024-12-27 | Stop reason: HOSPADM

## 2024-12-24 RX ORDER — ENOXAPARIN SODIUM 100 MG/ML
40 INJECTION SUBCUTANEOUS DAILY
Status: DISCONTINUED | OUTPATIENT
Start: 2024-12-24 | End: 2024-12-27 | Stop reason: HOSPADM

## 2024-12-24 RX ADMIN — ACETAMINOPHEN 650 MG: 325 TABLET ORAL at 22:46

## 2024-12-24 RX ADMIN — Medication 100 ML: at 14:42

## 2024-12-24 RX ADMIN — PIPERACILLIN AND TAZOBACTAM 3375 MG: 3; .375 INJECTION, POWDER, LYOPHILIZED, FOR SOLUTION INTRAVENOUS at 22:43

## 2024-12-24 RX ADMIN — PIPERACILLIN AND TAZOBACTAM 3375 MG: 3; .375 INJECTION, POWDER, LYOPHILIZED, FOR SOLUTION INTRAVENOUS at 16:08

## 2024-12-24 RX ADMIN — SODIUM CHLORIDE 1000 ML: 9 INJECTION, SOLUTION INTRAVENOUS at 14:27

## 2024-12-24 RX ADMIN — SODIUM CHLORIDE, PRESERVATIVE FREE 10 ML: 5 INJECTION INTRAVENOUS at 14:42

## 2024-12-24 RX ADMIN — IOPAMIDOL 75 ML: 755 INJECTION, SOLUTION INTRAVENOUS at 14:42

## 2024-12-24 ASSESSMENT — PAIN SCALES - GENERAL
PAINLEVEL_OUTOF10: 0
PAINLEVEL_OUTOF10: 2
PAINLEVEL_OUTOF10: 3

## 2024-12-24 ASSESSMENT — PAIN DESCRIPTION - LOCATION: LOCATION: ABDOMEN

## 2024-12-24 ASSESSMENT — PAIN - FUNCTIONAL ASSESSMENT: PAIN_FUNCTIONAL_ASSESSMENT: 0-10

## 2024-12-24 ASSESSMENT — PAIN DESCRIPTION - ORIENTATION: ORIENTATION: LEFT

## 2024-12-24 NOTE — ED NOTES
ED to inpatient nurses report      Chief Complaint:  Chief Complaint   Patient presents with    Fever    Post-op Problem     Patient had a Neproureterectomy last Tuesday, discharged on Friday morning, placed on cipro (last dose is tomorrow). Patient states she has been around 99.0 these last couple of days. Patient spiked a fever on Saturday, fever was around 101 called oncall doctor and they said to go to ER if fever went past 101.5. Patients fever was brought down  but last night fever was 100.0, took tylenol but patient states she \" is getting worried\" that is why she came here.      Present to ED from: Home    MOA:     LOC: alert and orientated to name, place, date  Mobility: Independent  Oxygen Baseline: Room Air     Current needs required: Abx administered    Pending ED orders: NA  Present condition: Stable     Why did the patient come to the ED? Fever , post nephrectomy left sided   What is the plan? Abx , monitor   Any procedures or intervention occur? ABX, Labs, CT scan  Any safety concerns?? NONE   CODE STATUS Prior  Diet No diet orders on file    Mental Status:       Psych Assessment:      Vital signs   Vitals:    12/24/24 1556 12/24/24 1600 12/24/24 1613 12/24/24 1626   BP: 130/75 123/76 124/78 124/78   Pulse:    97   Resp:    16   Temp:       TempSrc:       SpO2:  96% 98% 97%   Weight:       Height:            Vitals:  Patient Vitals for the past 24 hrs:   BP Temp Temp src Pulse Resp SpO2 Height Weight   12/24/24 1626 124/78 -- -- 97 16 97 % -- --   12/24/24 1613 124/78 -- -- -- -- 98 % -- --   12/24/24 1600 123/76 -- -- -- -- 96 % -- --   12/24/24 1556 130/75 -- -- -- -- -- -- --   12/24/24 1318 105/62 98.9 °F (37.2 °C) Oral (!) 106 18 99 % 1.676 m (5' 6\") 70.3 kg (155 lb)      Visit Vitals  /78   Pulse 97   Temp 98.9 °F (37.2 °C) (Oral)   Resp 16   Ht 1.676 m (5' 6\")   Wt 70.3 kg (155 lb)   SpO2 97%   BMI 25.02 kg/m²        LDAs:   Peripheral IV 12/24/24 Right Antecubital (Active)   Site              ALLERGIES     Prednisone    CURRENT MEDICATIONS       Previous Medications    CIPROFLOXACIN (CIPRO) 500 MG TABLET    Take 1 tablet by mouth 2 times daily for 5 days    MULTIPLE VITAMIN (DAILY VITAMIN FORMULA PO)    Daily Vitamin    MULTIPLE VITAMINS-MINERALS (HAIR SKIN & NAILS ADVANCED PO)    Take by mouth    PROBIOTIC CHEW    Take 1 tablet by mouth daily     Orders Placed This Encounter   Medications    sodium chloride 0.9 % bolus 1,000 mL    sodium chloride 0.9 % bolus 80 mL    sodium chloride flush 0.9 % injection 10 mL    iopamidol (ISOVUE-370) 76 % injection 75 mL    piperacillin-tazobactam (ZOSYN) 3,375 mg in sodium chloride 0.9 % 50 mL IVPB (mini-bag)     Order Specific Question:   Antimicrobial Indications     Answer:   Other     Order Specific Question:   Other Abx Indication     Answer:   intra-abdominal infection       SURGICAL HISTORY       Past Surgical History:   Procedure Laterality Date    CYSTOSCOPY Left 11/4/2024    CYSTOSCOPY RETROGRADE PYELOGRAM LEFT performed by Jimbo Aly MD at [facility] OR    DILATION AND CURETTAGE OF UTERUS  2004    FRACTURE SURGERY Left     tib and Fib as child    KIDNEY SURGERY Left 12/17/2024    NEPHROURETERECTOMY LAPAROSCOPIC ROBOTIC XI LEFT performed by Jimbo Aly MD at [facility] OR    URETER SURGERY Left 11/4/2024    URETEROSCOPY WITH URETERAL RENAL PELVIC BIOPSY LEFT performed by Jimbo Aly MD at [facility] OR       PAST MEDICAL HISTORY       Past Medical History:   Diagnosis Date    Anxiety     Colon polyp     Cystitis with hematuria     PONV (postoperative nausea and vomiting)     Renal mass     Urinary urgency     Urothelial carcinoma (HCC)            Consults:  IP CONSULT TO UROLOGY  IP CONSULT TO INFECTIOUS DISEASES     Treatment Team:   Treatment Team:   Adam Rossi, Bassem Rossi, Adry Vu RN    Treatment:        Electronically signed by Adry Zendejas RN on 12/24/2024 at 4:26 PM

## 2024-12-24 NOTE — ED PROVIDER NOTES
[address]  [address] [facility] [address]  Phone: [phone]      Attending Physician Attestation    I performed a history and physical examination of the patient and discussed management with the mid level provider. I reviewed the mid level provider's note and agree with the documented findings and plan of care. Any areas of disagreement are noted on the chart. I was personally present for the key portions of any procedures. I have documented in the chart those procedures where I was not present during the key portions. I have reviewed the emergency nurses triage note. I agree with the chief complaint, past medical history, past surgical history, allergies, medications, social and family history as documented unless otherwise noted below. Documentation of the HPI, Physical Exam and Medical Decision Making performed by mid level providers is based on my personal performance of the HPI, PE and MDM. For Physician Assistant/ Nurse Practitioner cases/documentation I have personally evaluated this patient and have completed at least one if not all key elements of the E/M (history, physical exam, and MDM). Additional findings are as noted.      CHIEF COMPLAINT       Chief Complaint   Patient presents with    Fever    Post-op Problem     Patient had a Neproureterectomy last Tuesday, discharged on Friday morning, placed on cipro (last dose is tomorrow). Patient states she has been around 99.0 these last couple of days. Patient spiked a fever on Saturday, fever was around 101 called oncall doctor and they said to go to ER if fever went past 101.5. Patients fever was brought down  but last night fever was 100.0, took tylenol but patient states she \" is getting worried\" that is why she came here.          HISTORY OF PRESENT ILLNESS    Alyce Nolasco is a 49 y.o. female who presents to the emergency department today because of continued fever.  She had a nephrectomy done last week along with a  ureterectomy because of cancer.  Postoperatively, day 4, she developed a fever.  This extended her hospital stay by 1 day.  They were unable to determine the etiology of the fever and ultimately discharged the patient home with a prescription for Cipro.  Despite this, she continues to have intermittent fevers and she was sent back in to the emergency department here.  No nausea vomiting.  Temperature at home was 101.5.  She has been using antipyretics which offers transient relief.  No cough.  No rhinorrhea.      PAST MEDICAL HISTORY    has a past medical history of Anxiety, Colon polyp, Cystitis with hematuria, PONV (postoperative nausea and vomiting), Renal mass, Urinary urgency, and Urothelial carcinoma (HCC).    SURGICAL HISTORY      has a past surgical history that includes Dilation and curettage of uterus (); fracture surgery (Left); Cystoscopy (Left, 2024); Ureter surgery (Left, 2024); and Kidney surgery (Left, 2024).    CURRENT MEDICATIONS       Current Discharge Medication List        CONTINUE these medications which have NOT CHANGED    Details   Multiple Vitamin (DAILY VITAMIN FORMULA PO) Daily Vitamin      Multiple Vitamins-Minerals (HAIR SKIN & NAILS ADVANCED PO) Take by mouth             ALLERGIES     is allergic to prednisone.    FAMILY HISTORY     She indicated that her mother is . She indicated that her father is .     family history includes Cancer in her mother; Colon Polyps in her father.    SOCIAL HISTORY      reports that she has quit smoking. Her smoking use included cigarettes. She has never used smokeless tobacco. She reports current alcohol use of about 2.0 standard drinks of alcohol per week. She reports that she does not use drugs.    PHYSICAL EXAM     INITIAL VITALS:  height is 1.676 m (5' 6\") and weight is 70.3 kg (155 lb). Her oral temperature is 98.8 °F (37.1 °C). Her blood pressure is 97/57 (abnormal) and her pulse is 75. Her respiration is 16 and

## 2024-12-24 NOTE — H&P
why she came here. )   and is admitted to the hospital for the management of Complicated UTI (urinary tract infection).    This very pleasant 49-year-old female presents the hospital with fevers.  The patient recently underwent nephroureterectomy for urothelial carcinoma.  She had developed hematuria which prompted the diagnosis of the carcinoma.  The patient underwent surgical resection on the .  Postoperatively she did develop fevers.  The patient was placed on Cipro and subsequently discharged home.  She has had persistent fevers since discharge and presented to the hospital.  CT scan shows the followin. Left nephrectomy with 10 cm locular gas and fluid collection at the nephrectomy bed, concerning for abscess given the associated gas, though this could reflect hematoma/seroma with postsurgical gas.   2. Typical appearing postsurgical sequela at the left anterior and lateral abdomen/pelvis.   3. Small left rectus muscle hematoma.     The patient has been initiated on Zosyn.  Consultation to urology as well as infectious disease have been obtained.  Further decision making will be made as her clinical scenario evolves.    Past Medical History:     Past Medical History:   Diagnosis Date    Anxiety     Colon polyp     Cystitis with hematuria     PONV (postoperative nausea and vomiting)     Renal mass     Urinary urgency     Urothelial carcinoma (HCC)         Past Surgical History:     Past Surgical History:   Procedure Laterality Date    CYSTOSCOPY Left 2024    CYSTOSCOPY RETROGRADE PYELOGRAM LEFT performed by Jimbo Aly MD at [facility] OR    DILATION AND CURETTAGE OF UTERUS      FRACTURE SURGERY Left     tib and Fib as child    KIDNEY SURGERY Left 2024    NEPHROURETERECTOMY LAPAROSCOPIC ROBOTIC XI LEFT performed by Jimbo Aly MD at [facility] OR    URETER SURGERY Left 2024    URETEROSCOPY WITH URETERAL RENAL PELVIC BIOPSY LEFT performed by Jimbo Aly MD at [facility] OR     Culture    Collection Time: 12/24/24  2:21 PM    Specimen: Urine, clean catch   Result Value Ref Range    Color, UA Yellow Yellow    Turbidity UA Clear Clear    Glucose, Ur NEGATIVE NEGATIVE mg/dL    Bilirubin, Urine NEGATIVE NEGATIVE    Ketones, Urine NEGATIVE NEGATIVE mg/dL    Specific Gravity, UA 1.010 1.005 - 1.030    Urine Hgb NEGATIVE NEGATIVE    pH, Urine 6.5 5.0 - 8.0    Protein, UA NEGATIVE NEGATIVE mg/dL    Urobilinogen, Urine Normal 0.0 - 1.0 EU/dL    Nitrite, Urine NEGATIVE NEGATIVE    Leukocyte Esterase, Urine NEGATIVE NEGATIVE    Comment       Microscopic exam not performed based on chemical results unless requested in original order.    Comment          Comment       Utilizing a urinalysis as the only screening method to exclude a potential uropathogen can be unreliable in many patient populations.  Rapid screening tests are less sensitive than culture and if UTI is a clinical possibility, culture should be considered despite a negative urinalysis.     COVID-19, Rapid    Collection Time: 12/24/24  2:28 PM    Specimen: Nasopharyngeal Swab   Result Value Ref Range    Specimen Description .NASOPHARYNGEAL SWAB     SARS-CoV-2, Rapid Not Detected Not Detected       Imaging/Diagnostics:  CT ABDOMEN PELVIS W IV CONTRAST Additional Contrast? None    Result Date: 12/24/2024  1. Left nephrectomy with 10 cm locular gas and fluid collection at the nephrectomy bed, concerning for abscess given the associated gas, though this could reflect hematoma/seroma with postsurgical gas. 2. Typical appearing postsurgical sequela at the left anterior and lateral abdomen/pelvis. 3. Small left rectus muscle hematoma.     XR CHEST (2 VW)    Result Date: 12/24/2024  1. Hyperlucency under the right hemidiaphragm, unclear if this represents overlapping of shadows or free air under the diaphragm. Recommend further evaluation with CT of the abdomen and pelvis. 2. No acute cardiopulmonary process.       Assessment :      Hospital

## 2024-12-24 NOTE — ED PROVIDER NOTES
EMERGENCY DEPARTMENT ENCOUNTER      Pt Name: Alyce Nolasco  MRN: [id number]  Birthdate 1975  Date of evaluation: 12/24/2024  Provider: Bassem Vale PA-C    CHIEF COMPLAINT       Chief Complaint   Patient presents with    Fever    Post-op Problem     Patient had a Neproureterectomy last Tuesday, discharged on Friday morning, placed on cipro (last dose is tomorrow). Patient states she has been around 99.0 these last couple of days. Patient spiked a fever on Saturday, fever was around 101 called oncall doctor and they said to go to ER if fever went past 101.5. Patients fever was brought down  but last night fever was 100.0, took tylenol but patient states she \" is getting worried\" that is why she came here.          HISTORY OF PRESENT ILLNESS      Alyce Nolasco is a 49 y.o. female who presents to the emergency department with  c/o of fevers last night of 101. Pt underwent NEPHROURETERECTOMY 1 week ago at [facility] by Dr. Aly, urology.  She states that she was admitted to the hospital until Friday, she states at that time her  WBC count was trending down, she was also discharged on ciprofloxacin.  She states that over the weekend she had some mild fluctuation of her temperature but what concerned her was last night was around 1 1.  She denies any associated symptoms such as abdominal pain nausea vomiting, cough, dysuria.  She states that she contacted on-call urology who instructed her to come to get evaluated          REVIEW OF SYSTEMS       Review of Systems   AS STATED IN HPI      PAST MEDICAL HISTORY     Past Medical History:   Diagnosis Date    Anxiety     Colon polyp     Cystitis with hematuria     PONV (postoperative nausea and vomiting)     Renal mass     Urinary urgency     Urothelial carcinoma (HCC)          SURGICAL HISTORY       Past Surgical History:   Procedure Laterality Date    CYSTOSCOPY Left 11/4/2024    CYSTOSCOPY RETROGRADE PYELOGRAM LEFT performed by [facility]  Dr. Mahmood who instructed to obtain labs and a CT scan with contrast abdomen pelvis.  Also will check for COVID and influenza, will get chest x-ray to rule out any possible pneumonias.    Results were relayed to urologist who would like her admitted to be seen by infectious disease, antibiotics initiated in the emergency room.  May need further drainage    Spoke with hospitalist who agrees for admission    Spoke with patient who remains hemodynamically stable and nontoxic-appearing.  Agrees for admission.           FINAL IMPRESSION      1. Intra-abdominal infection after surgical procedure          DISPOSITION/PLAN     DISPOSITION Admitted 12/24/2024 03:54:09 PM                 (Please note that portions of this note were completed with a voice recognition program.  Efforts were made to edit the dictations but occasionally words are mis-transcribed.)    Bassem Vale PA-C (electronically signed)       Bassem Vale PA-C  12/24/24 2358

## 2024-12-25 PROBLEM — T88.8XXA FLUID COLLECTION AT SURGICAL SITE: Status: ACTIVE | Noted: 2024-12-25

## 2024-12-25 PROBLEM — C68.9 UROTHELIAL CARCINOMA (HCC): Status: ACTIVE | Noted: 2024-12-25

## 2024-12-25 PROBLEM — T81.40XA INTRA-ABDOMINAL INFECTION AFTER SURGICAL PROCEDURE: Status: ACTIVE | Noted: 2024-12-25

## 2024-12-25 LAB
ANION GAP SERPL CALCULATED.3IONS-SCNC: 11 MMOL/L (ref 9–17)
BASOPHILS # BLD: 0 K/UL (ref 0–0.2)
BASOPHILS NFR BLD: 1 % (ref 0–2)
BUN SERPL-MCNC: 11 MG/DL (ref 6–20)
CALCIUM SERPL-MCNC: 8.8 MG/DL (ref 8.6–10.4)
CHLORIDE SERPL-SCNC: 103 MMOL/L (ref 98–107)
CO2 SERPL-SCNC: 23 MMOL/L (ref 20–31)
CREAT SERPL-MCNC: 1.1 MG/DL (ref 0.5–0.9)
EOSINOPHIL # BLD: 0.3 K/UL (ref 0–0.4)
EOSINOPHILS RELATIVE PERCENT: 3 % (ref 1–4)
ERYTHROCYTE [DISTWIDTH] IN BLOOD BY AUTOMATED COUNT: 12.1 % (ref 12.5–15.4)
GFR, ESTIMATED: 62 ML/MIN/1.73M2
GLUCOSE SERPL-MCNC: 99 MG/DL (ref 70–99)
HCT VFR BLD AUTO: 28.6 % (ref 36–46)
HGB BLD-MCNC: 9.6 G/DL (ref 12–16)
LYMPHOCYTES NFR BLD: 1.6 K/UL (ref 1–4.8)
LYMPHOCYTES RELATIVE PERCENT: 18 % (ref 24–44)
MCH RBC QN AUTO: 30.9 PG (ref 26–34)
MCHC RBC AUTO-ENTMCNC: 33.5 G/DL (ref 31–37)
MCV RBC AUTO: 92.3 FL (ref 80–100)
MONOCYTES NFR BLD: 0.8 K/UL (ref 0.1–1.2)
MONOCYTES NFR BLD: 10 % (ref 2–11)
NEUTROPHILS NFR BLD: 68 % (ref 36–66)
NEUTS SEG NFR BLD: 6 K/UL (ref 1.8–7.7)
PLATELET # BLD AUTO: 326 K/UL (ref 140–450)
PMV BLD AUTO: 7.4 FL (ref 6–12)
POTASSIUM SERPL-SCNC: 4 MMOL/L (ref 3.7–5.3)
RBC # BLD AUTO: 3.1 M/UL (ref 4–5.2)
SODIUM SERPL-SCNC: 137 MMOL/L (ref 135–144)
WBC OTHER # BLD: 8.7 K/UL (ref 3.5–11)

## 2024-12-25 PROCEDURE — 88173 CYTOPATH EVAL FNA REPORT: CPT

## 2024-12-25 PROCEDURE — 1200000000 HC SEMI PRIVATE

## 2024-12-25 PROCEDURE — 85025 COMPLETE CBC W/AUTO DIFF WBC: CPT

## 2024-12-25 PROCEDURE — 88305 TISSUE EXAM BY PATHOLOGIST: CPT

## 2024-12-25 PROCEDURE — 6360000002 HC RX W HCPCS: Performed by: HOSPITALIST

## 2024-12-25 PROCEDURE — 36415 COLL VENOUS BLD VENIPUNCTURE: CPT

## 2024-12-25 PROCEDURE — 99232 SBSQ HOSP IP/OBS MODERATE 35: CPT | Performed by: HOSPITALIST

## 2024-12-25 PROCEDURE — 99222 1ST HOSP IP/OBS MODERATE 55: CPT | Performed by: INTERNAL MEDICINE

## 2024-12-25 PROCEDURE — 2580000003 HC RX 258: Performed by: HOSPITALIST

## 2024-12-25 PROCEDURE — 80048 BASIC METABOLIC PNL TOTAL CA: CPT

## 2024-12-25 RX ADMIN — PIPERACILLIN AND TAZOBACTAM 3375 MG: 3; .375 INJECTION, POWDER, LYOPHILIZED, FOR SOLUTION INTRAVENOUS at 07:32

## 2024-12-25 RX ADMIN — PIPERACILLIN AND TAZOBACTAM 3375 MG: 3; .375 INJECTION, POWDER, LYOPHILIZED, FOR SOLUTION INTRAVENOUS at 14:10

## 2024-12-25 RX ADMIN — PIPERACILLIN AND TAZOBACTAM 3375 MG: 3; .375 INJECTION, POWDER, LYOPHILIZED, FOR SOLUTION INTRAVENOUS at 22:49

## 2024-12-25 NOTE — PROGRESS NOTES
[facility]  Office: [phone]  Gilberto Rossi DO, Guilherme Duke DO, Ez Membreno DO, Ajay Walden DO, Rosy Amos MD, Rebecca Fagan MD, Mary Jane Wallace MD, Fang Vargas MD,  Martin Sandoval MD, Shalini Murphy MD, Alpesh Nathan MD,  Moe Bell DO, Bennie Crump MD, Ubaldo Higgins MD, Adam Rossi DO, Lore Cat MD,  Matt Arriola DO, Val Castillo MD, Lisette Ibanez MD, Yuliana Yoder MD, Oliverio Valles MD,  Roland Chambers MD, Eber Nicolas MD, Nancy Shahid MD, Babs Morales MD, Dangelo Louise MD, Yue Novoa MD, Jef Beyer DO, Gregg Stevenson MD, Shirley Waterhouse, CNP,  Carley Luna CNP, Jef Ballard, MICHAEL,  Loraine Petersen, JARED, Simona Wylie, CNP, Angela Holly, CNP, Alyce Ford, CNP, Cate Cruz, CNP, Bhakti Marte PA-C, Molly Mcneal PA-C, Amy Maria, MICHAEL, Taryn Bolaños, CNP,  Margoth Catherine, CNP, Filomena Lin, CNP,  Angela Coleman, CNP, Carmen Grace, CNP         [facility]   IN-PATIENT SERVICE   [facility]    Progress Note    12/25/2024    10:15 AM    Name:   Alyce Nolasco  MRN:     [id number]     Acct:      [id number]   Room:   [address] [place] [id number] Day:  1  Admit Date:  12/24/2024  1:06 PM    PCP:   Herb Canada MD  Code Status:  Full Code    Subjective:     Patient seen in follow-up for fevers secondary to postoperative fluid collection, patient states \"I feel okay\"    Patient is doing reasonably well.  She is presently on Zosyn.  Infectious disease input noted and appreciated.  Will ask interventional radiology to provide fluid aspirate of the surgical site.  Urology consult is pending.  Patient overall is doing reasonably well and denies any questions or concerns.    Medications:     Allergies:    Allergies   Allergen Reactions    Prednisone Shortness Of Breath     High Dose only       Current Meds:   Scheduled Meds:    sodium chloride  80 mL IntraVENous Once    sodium chloride flush  5-40 mL IntraVENous 2 times  collection at the nephrectomy bed, concerning for abscess given the associated gas, though this could reflect hematoma/seroma with postsurgical gas. 2. Typical appearing postsurgical sequela at the left anterior and lateral abdomen/pelvis. 3. Small left rectus muscle hematoma.     XR CHEST (2 VW)    Result Date: 12/24/2024  1. Hyperlucency under the right hemidiaphragm, unclear if this represents overlapping of shadows or free air under the diaphragm. Recommend further evaluation with CT of the abdomen and pelvis. 2. No acute cardiopulmonary process.       Physical Examination:     General appearance:  alert, cooperative and no distress  Mental Status:  oriented to person, place and time and normal affect  Lungs:  clear to auscultation bilaterally, normal effort  Heart:  regular rate and rhythm, no murmur  Abdomen:  soft, nontender, nondistended, normal bowel sounds, no masses, hepatomegaly, splenomegaly  Extremities:  no edema, redness, tenderness in the calves  Skin:  no gross lesions, rashes, induration    Assessment:     Hospital Problems             Last Modified POA    * (Principal) Complicated UTI (urinary tract infection) 12/24/2024 Yes    Intra-abdominal infection after surgical procedure 12/25/2024 Yes    Fluid collection at surgical site 12/25/2024 Yes    Urothelial carcinoma (HCC) 12/25/2024 Yes       Plan:     Postoperative fever/intra-abdominal process  Continue Zosyn  Follow-up cultures  Consult interventional radiology for CT guided aspiration  Gram stain/cytology/cultures    Medical Decision Making: Uma Rossi DO  12/25/2024  10:15 AM

## 2024-12-25 NOTE — CARE COORDINATION
Case Management Assessment  Initial Evaluation    Date/Time of Evaluation: 12/25/2024 8:29 AM  Assessment Completed by: Haley Ponce RN    If patient is discharged prior to next notation, then this note serves as note for discharge by case management.    Patient Name: Alyce Nolasco                   YOB: 1975  Diagnosis: Complicated UTI (urinary tract infection) [N39.0]  Intra-abdominal infection after surgical procedure [T81.40XA]                   Date / Time: 12/24/2024  1:06 PM    Patient Admission Status: Inpatient   Readmission Risk (Low < 19, Mod (19-27), High > 27): Readmission Risk Score: 7.6    Current PCP: Herb Canada MD  PCP verified by CM? (P) Yes    Chart Reviewed: Yes      History Provided by: (P) Patient  Patient Orientation: (P) Alert and Oriented    Patient Cognition: (P) Alert    Hospitalization in the last 30 days (Readmission):  Yes    If yes, Readmission Assessment in  Navigator will be completed.    Advance Directives:      Code Status: Full Code   Patient's Primary Decision Maker is: (P) Legal Next of Kin      Discharge Planning:    Patient lives with: (P) Spouse/Significant Other Type of Home: (P) House  Primary Care Giver: (P) Self  Patient Support Systems include: (P) Spouse/Significant Other   Current Financial resources: (P) None  Current community resources: (P) None  Current services prior to admission: (P) None            Current DME:              Type of Home Care services:  (P) None    ADLS  Prior functional level: (P) Independent in ADLs/IADLs  Current functional level: (P) Independent in ADLs/IADLs    PT AM-PAC:   /24  OT AM-PAC:   /24    Family can provide assistance at DC: (P) Yes  Would you like Case Management to discuss the discharge plan with any other family members/significant others, and if so, who? (P) No  Plans to Return to Present Housing: (P) Yes  Other Identified Issues/Barriers to RETURNING to current housing: None noted.  Potential Assistance

## 2024-12-25 NOTE — VIRTUAL HEALTH
Alyce Nolasco, was evaluated through a synchronous (real-time) audio-video encounter. The patient (and/or guardian if applicable) is aware that this is a billable service, which includes applicable co-pays. This virtual visit was conducted with patient's (and/or legal guardian's) consent. Patient identification was verified, and a caregiver was present when appropriate.  The patient was located at Facility (Appt Department): [address] [address]  Loc: [phone]  The provider was located at Facility (Login Dept): [facility]  [address] [facility] [id number] [phone]  Confirm you are appropriately licensed, registered, or certified to deliver care in the [facility] where the patient is located as indicated above. If you are not or unsure, please re-schedule the visit: Yes, I confirm.   Consults     Total time spent on this encounter: Not billed by time    --Steve Estrada MD on 12/25/2024 at 7:03 AM    An electronic signature was used to authenticate this note.      [facility] - Initial Consult Note  Today's Date and Time: 12/25/2024, 7:03 AM    Impression :   Hematuria  Urothelial carcinoma  S/P Nephroureterectomy on 12-17-24  Fevers   Post-op Fluid collection at the nephrectomy site  Hematoma vs seroma vs abscess  Small Lt rectus muscle hematoma  Anemia  Hb 12.9-->11.4-->11.6-->9.6    Recommendations:   Continue Zosyn pending further data  IR aspirate of fluid collection with cultures, cytology  Urology evaluation      Pre-op urine cultures yielded no growth  Current UA normal  Blood cultures so far: No growth   Progressive drop in Hb would suggest hematoma    Medical Decision Making/Summary/Discussion:12/25/2024     Daily Elements of Decision Making provided by Consulting Physician:    Note: I have independently performed the steps listed below as part of the medical decision making and

## 2024-12-26 ENCOUNTER — APPOINTMENT (OUTPATIENT)
Age: 49
DRG: 920 | End: 2024-12-26
Attending: HOSPITALIST
Payer: COMMERCIAL

## 2024-12-26 LAB
ANION GAP SERPL CALCULATED.3IONS-SCNC: 9 MMOL/L (ref 9–17)
BUN SERPL-MCNC: 9 MG/DL (ref 6–20)
CALCIUM SERPL-MCNC: 8.9 MG/DL (ref 8.6–10.4)
CASE NUMBER:: NORMAL
CHLORIDE SERPL-SCNC: 102 MMOL/L (ref 98–107)
CO2 SERPL-SCNC: 25 MMOL/L (ref 20–31)
CREAT SERPL-MCNC: 1.1 MG/DL (ref 0.5–0.9)
ERYTHROCYTE [DISTWIDTH] IN BLOOD BY AUTOMATED COUNT: 12.2 % (ref 12.5–15.4)
FOLATE SERPL-MCNC: 14.9 NG/ML (ref 4.8–24.2)
GFR, ESTIMATED: 62 ML/MIN/1.73M2
GLUCOSE SERPL-MCNC: 126 MG/DL (ref 70–99)
HCT VFR BLD AUTO: 33.6 % (ref 36–46)
HGB BLD-MCNC: 11.1 G/DL (ref 12–16)
MCH RBC QN AUTO: 30.6 PG (ref 26–34)
MCHC RBC AUTO-ENTMCNC: 33.1 G/DL (ref 31–37)
MCV RBC AUTO: 92.5 FL (ref 80–100)
PLATELET # BLD AUTO: 435 K/UL (ref 140–450)
PMV BLD AUTO: 7.6 FL (ref 6–12)
POC INR: 1
POTASSIUM SERPL-SCNC: 4.1 MMOL/L (ref 3.7–5.3)
PROTHROMBIN TIME, POC: 10.5 SEC (ref 10.4–14.2)
RBC # BLD AUTO: 3.63 M/UL (ref 4–5.2)
SODIUM SERPL-SCNC: 136 MMOL/L (ref 135–144)
SPECIMEN DESCRIPTION: NORMAL
VIT B12 SERPL-MCNC: 468 PG/ML (ref 232–1245)
WBC OTHER # BLD: 8.8 K/UL (ref 3.5–11)

## 2024-12-26 PROCEDURE — 82607 VITAMIN B-12: CPT

## 2024-12-26 PROCEDURE — 1200000000 HC SEMI PRIVATE

## 2024-12-26 PROCEDURE — 2500000003 HC RX 250 WO HCPCS: Performed by: HOSPITALIST

## 2024-12-26 PROCEDURE — 6360000002 HC RX W HCPCS: Performed by: RADIOLOGY

## 2024-12-26 PROCEDURE — 99232 SBSQ HOSP IP/OBS MODERATE 35: CPT | Performed by: INTERNAL MEDICINE

## 2024-12-26 PROCEDURE — 87070 CULTURE OTHR SPECIMN AEROBIC: CPT

## 2024-12-26 PROCEDURE — 85027 COMPLETE CBC AUTOMATED: CPT

## 2024-12-26 PROCEDURE — 99232 SBSQ HOSP IP/OBS MODERATE 35: CPT | Performed by: HOSPITALIST

## 2024-12-26 PROCEDURE — 87205 SMEAR GRAM STAIN: CPT

## 2024-12-26 PROCEDURE — 87075 CULTR BACTERIA EXCEPT BLOOD: CPT

## 2024-12-26 PROCEDURE — 83550 IRON BINDING TEST: CPT

## 2024-12-26 PROCEDURE — 36415 COLL VENOUS BLD VENIPUNCTURE: CPT

## 2024-12-26 PROCEDURE — 80048 BASIC METABOLIC PNL TOTAL CA: CPT

## 2024-12-26 PROCEDURE — 83540 ASSAY OF IRON: CPT

## 2024-12-26 PROCEDURE — 50390 DRAINAGE OF KIDNEY LESION: CPT

## 2024-12-26 PROCEDURE — 82746 ASSAY OF FOLIC ACID SERUM: CPT

## 2024-12-26 PROCEDURE — 0W9H3ZZ DRAINAGE OF RETROPERITONEUM, PERCUTANEOUS APPROACH: ICD-10-PCS | Performed by: RADIOLOGY

## 2024-12-26 PROCEDURE — 85610 PROTHROMBIN TIME: CPT

## 2024-12-26 PROCEDURE — 82728 ASSAY OF FERRITIN: CPT

## 2024-12-26 RX ORDER — LIDOCAINE HYDROCHLORIDE 10 MG/ML
INJECTION, SOLUTION EPIDURAL; INFILTRATION; INTRACAUDAL; PERINEURAL PRN
Status: COMPLETED | OUTPATIENT
Start: 2024-12-26 | End: 2024-12-26

## 2024-12-26 RX ADMIN — SODIUM CHLORIDE, PRESERVATIVE FREE 10 ML: 5 INJECTION INTRAVENOUS at 09:00

## 2024-12-26 RX ADMIN — LIDOCAINE HYDROCHLORIDE 5 ML: 10 INJECTION, SOLUTION EPIDURAL; INFILTRATION; INTRACAUDAL; PERINEURAL at 09:11

## 2024-12-26 NOTE — VIRTUAL HEALTH
Alyce Nolasco, was evaluated through a synchronous (real-time) audio-video encounter. The patient (and/or guardian if applicable) is aware that this is a billable service, which includes applicable co-pays. This virtual visit was conducted with patient's (and/or legal guardian's) consent. Patient identification was verified, and a caregiver was present when appropriate.  The patient was located at Facility (Appt Department): [address] [address]  Loc: [phone]  The provider was located at Facility (Login Dept): [facility]  [address] [facility] [id number] [phone]  Confirm you are appropriately licensed, registered, or certified to deliver care in the [facility] where the patient is located as indicated above. If you are not or unsure, please re-schedule the visit: Yes, I confirm.   Consults       Progress Note    Total time spent on this encounter: Not billed by time    --Steve Estrada MD on 12/26/2024 at 10:10 AM    An electronic signature was used to authenticate this note.      [facility] .Progress Note    Today's Date and Time: 12/26/2024, 10:10 AM    Impression :   Hematuria  Urothelial carcinoma  S/P Nephroureterectomy on 12-17-24  Fevers   Post-op Fluid collection at the nephrectomy site  Hematoma vs seroma vs abscess  Small Lt rectus muscle hematoma  Anemia  Hb 12.9-->11.4-->11.6-->9.6    Recommendations:   Continue Zosyn pending further data  IR aspirate of fluid collection with cultures, cytology  Urology evaluation      Pre-op urine cultures yielded no growth  Current UA normal  Blood cultures so far: No growth   Progressive drop in Hb would suggest hematoma    Medical Decision Making/Summary/Discussion:12/26/2024     Daily Elements of Decision Making provided by Consulting Physician:    Note: I have independently performed the steps listed below as part of the medical decision making  Comment:       Rapid NAAT:  The specimen is NEGATIVE for SARS-CoV-2, the novel coronavirus associated with   COVID-19.        The ID NOW COVID-19 assay is designed to detect the virus that causes COVID-19 in patients   with signs and symptoms of infection who are suspected of COVID-19.  An individual without symptoms of COVID-19 and who is not shedding SARS-CoV-2 virus would   expect to have a negative (not detected) result in this assay.  Negative results should be treated as presumptive and, if inconsistent with clinical signs   and symptoms or necessary for patient management,  should be tested with an alternative molecular assay. Negative results do not preclude   SARS-CoV-2 infection and   should not be used as the sole basis for patient management decisions.         Methodology: Isothermal Nucleic Acid Amplification         Culture, Blood 1 [[id number]] Collected: 12/24/24 1403    Order Status: Completed Specimen: Blood Updated: 12/25/24 2116     Specimen Description .BLOOD     Special Requests RIGHT HAND 5ML     Culture NO GROWTH 1 DAY    Culture, Blood 1 [[id number]] Collected: 12/24/24 1400    Order Status: Completed Specimen: Blood Updated: 12/25/24 2119     Specimen Description .BLOOD     Special Requests LEFT AC 10ML     Culture NO GROWTH 1 DAY    Culture, Urine [[id number]] Collected: 12/17/24 1740    Order Status: Completed Specimen: Urine, catheter Updated: 12/18/24 1927     Specimen Description .CATHETERIZED URINE     Culture NO GROWTH              Medical Decision Making-Other:     Note:    Thank you for allowing us to participate in the care of this patient. Please call with questions.    Steve Estrada MD  Office: [phone]

## 2024-12-26 NOTE — CARE COORDINATION
[facility] Quality Flow/Interdisciplinary Rounds Progress Note    Quality Flow Rounds held on December 26, 2024 at 0930    Disciplines Attending:  Bedside Nurse, , and Nursing Unit Leadership    Barriers to Discharge: Clinical status    Anticipated Discharge Date:   12/27/24    Anticipated Discharge Disposition: Home    [facility] RISK OF UNPLANNED READMISSION 2.0             7.4 Total Score        Discussed patient goal for the day, patient clinical progression, and barriers to discharge.  Patient to IR this morning for renal cyst aspiration. Patient continues on IV zosyn with ID following. Plan to return home independently at discharge.      Cristina Kelsey RN  December 26, 2024

## 2024-12-26 NOTE — PROGRESS NOTES
\"O2SAT\", \"FIO2\"  Lab Results   Component Value Date/Time    SPECIAL RIGHT HAND 5ML 12/24/2024 02:03 PM     Lab Results   Component Value Date/Time    CULTURE NO GROWTH 1 DAY 12/24/2024 02:03 PM       Radiology:  CT ABDOMEN PELVIS W IV CONTRAST Additional Contrast? None    Result Date: 12/24/2024  1. Left nephrectomy with 10 cm locular gas and fluid collection at the nephrectomy bed, concerning for abscess given the associated gas, though this could reflect hematoma/seroma with postsurgical gas. 2. Typical appearing postsurgical sequela at the left anterior and lateral abdomen/pelvis. 3. Small left rectus muscle hematoma.     XR CHEST (2 VW)    Result Date: 12/24/2024  1. Hyperlucency under the right hemidiaphragm, unclear if this represents overlapping of shadows or free air under the diaphragm. Recommend further evaluation with CT of the abdomen and pelvis. 2. No acute cardiopulmonary process.       Physical Examination:     General appearance:  alert, cooperative and no distress  Mental Status:  oriented to person, place and time and normal affect  Lungs:  clear to auscultation bilaterally, normal effort  Heart:  regular rate and rhythm, no murmur  Abdomen:  soft, nontender, nondistended, normal bowel sounds, no masses, hepatomegaly, splenomegaly  Extremities:  no edema, redness, tenderness in the calves  Skin:  no gross lesions, rashes, induration    Assessment:     Hospital Problems             Last Modified POA    * (Principal) Complicated UTI (urinary tract infection) 12/24/2024 Yes    Intra-abdominal infection after surgical procedure 12/25/2024 Yes    Fluid collection at surgical site 12/25/2024 Yes    Urothelial carcinoma (HCC) 12/25/2024 Yes       Plan:     Postoperative fever/intra-abdominal process  Continue Zosyn  Status post aspiration  Follow cultures  Infectious disease/urology following  Anemia  Check repeat labs  Check iron studies, B12/folate    Medical Decision Making: Uma JERONIMO  DO Flo  12/26/2024  11:17 AM

## 2024-12-27 VITALS
OXYGEN SATURATION: 95 % | RESPIRATION RATE: 16 BRPM | TEMPERATURE: 98.8 F | HEART RATE: 75 BPM | DIASTOLIC BLOOD PRESSURE: 57 MMHG | SYSTOLIC BLOOD PRESSURE: 97 MMHG | HEIGHT: 66 IN | BODY MASS INDEX: 24.91 KG/M2 | WEIGHT: 155 LBS

## 2024-12-27 LAB
FERRITIN SERPL-MCNC: 172 NG/ML (ref 15–150)
IRON SATN MFR SERPL: 7 % (ref 20–55)
IRON SERPL-MCNC: 22 UG/DL (ref 37–145)
SURGICAL PATHOLOGY REPORT: NORMAL
TIBC SERPL-MCNC: 326 UG/DL (ref 250–450)
UNSATURATED IRON BINDING CAPACITY: 304 UG/DL (ref 112–347)

## 2024-12-27 PROCEDURE — 2580000003 HC RX 258: Performed by: HOSPITALIST

## 2024-12-27 PROCEDURE — 6370000000 HC RX 637 (ALT 250 FOR IP): Performed by: HOSPITALIST

## 2024-12-27 PROCEDURE — 99238 HOSP IP/OBS DSCHRG MGMT 30/<: CPT | Performed by: HOSPITALIST

## 2024-12-27 PROCEDURE — 6360000002 HC RX W HCPCS: Performed by: HOSPITALIST

## 2024-12-27 PROCEDURE — 99232 SBSQ HOSP IP/OBS MODERATE 35: CPT | Performed by: INTERNAL MEDICINE

## 2024-12-27 RX ORDER — LACTOBACILLUS RHAMNOSUS GG 10B CELL
1 CAPSULE ORAL
Status: DISCONTINUED | OUTPATIENT
Start: 2024-12-27 | End: 2024-12-27 | Stop reason: HOSPADM

## 2024-12-27 RX ORDER — MOXIFLOXACIN HYDROCHLORIDE 400 MG/1
400 TABLET ORAL DAILY
Qty: 7 TABLET | Refills: 0 | Status: SHIPPED | OUTPATIENT
Start: 2024-12-27 | End: 2025-01-03

## 2024-12-27 RX ADMIN — PIPERACILLIN AND TAZOBACTAM 3375 MG: 3; .375 INJECTION, POWDER, LYOPHILIZED, FOR SOLUTION INTRAVENOUS at 01:16

## 2024-12-27 RX ADMIN — Medication 1 CAPSULE: at 12:00

## 2024-12-27 RX ADMIN — SODIUM CHLORIDE: 9 INJECTION, SOLUTION INTRAVENOUS at 00:03

## 2024-12-27 NOTE — CARE COORDINATION
[facility] Quality Flow/Interdisciplinary Rounds Progress Note    Quality Flow Rounds held on December 27, 2024 at 0930    Disciplines Attending:  Bedside Nurse, , and Nursing Unit Leadership    Barriers to Discharge: Clinical status    Anticipated Discharge Date:   12/27/24    Anticipated Discharge Disposition: Home    [facility] RISK OF UNPLANNED READMISSION 2.0             6.2 Total Score        Discussed patient goal for the day, patient clinical progression, and barriers to discharge.  Plan for patient to discharge home this evening on PO antibiotics.      Cristina Kelsey RN  December 27, 2024

## 2024-12-27 NOTE — PROGRESS NOTES
Spiritual Health History and Assessment/Progress Note  [facility]    (P) Initial Encounter, Spiritual/Emotional Needs (Curious incident of Pt. being visited by another Pt., who is a personal friend.  Pronounced blessing over both.),  ,  ,      Name: Alyce Nolasco MRN: [id number]    Age: 49 y.o.     Sex: female   Language: English   Latter day: Unknown   Complicated UTI (urinary tract infection)     Date: 12/26/2024            Total Time Calculated: (P) 10 min              Spiritual Assessment began in [address]        Referral/Consult From: (P) Rounding   Encounter Overview/Reason: (P) Initial Encounter, Spiritual/Emotional Needs (Curious incident of Pt. being visited by another Pt., who is a personal friend.  Pronounced blessing over both.)  Service Provided For: (P) Patient, Friend    Ada, Belief, Meaning:   Patient identifies as spiritual and has beliefs or practices that help with coping during difficult times  Family/Friends identify as spiritual and have beliefs or practices that help with coping during difficult times      Importance and Influence:  Patient has spiritual/personal beliefs that influence decisions regarding their health  Family/Friends have spiritual/personal beliefs that influence decisions regarding the patient's health    Community:  Patient feels well-supported. Support system includes: Friends and Extended family  Family/Friends feel well-supported. Support system includes: Friends and Extended family    Assessment and Plan of Care:     Patient Interventions include: Affirmed coping skills/support systems and Provided sacramental/Jainism ritual  Family/Friends Interventions include: Affirmed coping skills/support systems and Provided sacramental/Jainism ritual    Patient Plan of Care: Spiritual Care available upon further referral  Family/Friends Plan of Care: Spiritual Care available upon further referral    Electronically signed by Chaplain Steven on

## 2024-12-27 NOTE — PLAN OF CARE
Problem: Discharge Planning  Goal: Discharge to home or other facility with appropriate resources  Outcome: Completed  Flowsheets (Taken 12/27/2024 8930)  Discharge to home or other facility with appropriate resources:   Identify barriers to discharge with patient and caregiver   Arrange for needed discharge resources and transportation as appropriate     Problem: Pain  Goal: Verbalizes/displays adequate comfort level or baseline comfort level  Outcome: Completed     Problem: ABCDS Injury Assessment  Goal: Absence of physical injury  Outcome: Completed

## 2024-12-27 NOTE — VIRTUAL HEALTH
inflammatory changes evident. No obstruction is seen.  The appendix is visualized right lower quadrant, unremarkable in appearance. The colon appears unremarkable. Pelvis: Small amount of gas is seen within urinary bladder, correlate with history of instrumentation.  Uterus and adnexal regions appear unremarkable. No pneumoperitoneum evident. Peritoneum/Retroperitoneum: Small volume pneumoperitoneum presumably from recent left nephrectomy. Locular gas and fluid collection at the nephrectomy bed 3.6 x 6.4 x 10.7 cm (axial series 2, image 61, coronal image 65). Bones/Soft Tissues: Small left rectus hematoma superficial to the mid pelvis. Gas in the subcutaneous soft tissues of the ventral abdomen and left lateral abdomen typical of postsurgical sequela.     1. Left nephrectomy with 10 cm locular gas and fluid collection at the nephrectomy bed, concerning for abscess given the associated gas, though this could reflect hematoma/seroma with postsurgical gas. 2. Typical appearing postsurgical sequela at the left anterior and lateral abdomen/pelvis. 3. Small left rectus muscle hematoma.     XR CHEST (2 VW)    Result Date: 12/24/2024  EXAMINATION: TWO XRAY VIEWS OF THE CHEST 12/24/2024 2:45 pm COMPARISON: CXR dated 12/16/2024 HISTORY: ORDERING SYSTEM PROVIDED HISTORY: post op fever TECHNOLOGIST PROVIDED HISTORY: post op fever Reason for Exam: post op fever FINDINGS: Hyperlucency under the right hemidiaphragm, unclear if this represents overlapping of shadows or free air under the diaphragm. Mediastinum/Heart: The mediastinal contours are normal. The heart appears normal in size. Lungs: Right basilar atelectasis.  The lungs are otherwise clear. Pleura: No pleural effusion. No pneumothorax.     1. Hyperlucency under the right hemidiaphragm, unclear if this represents overlapping of shadows or free air under the diaphragm. Recommend further evaluation with CT of the abdomen and pelvis. 2. No acute cardiopulmonary process.  Collected: 12/24/24 1400    Order Status: Completed Specimen: Blood Updated: 12/26/24 2119     Specimen Description .BLOOD     Special Requests LEFT AC 10ML     Culture NO GROWTH 2 DAYS    Culture, Urine [[id number]] Collected: 12/17/24 1740    Order Status: Completed Specimen: Urine, catheter Updated: 12/18/24 1927     Specimen Description .CATHETERIZED URINE     Culture NO GROWTH              Medical Decision Making-Other:     Note:    Thank you for allowing us to participate in the care of this patient. Please call with questions.    Steve Estrada MD  Office: [phone]

## 2024-12-27 NOTE — DISCHARGE SUMMARY
[facility]  Office: [phone]  Gilberto Rossi DO, Guilherme Duke DO, Ez Membreno DO, Ajay Walden DO, Rosy Amos MD, Rebecca Fagan MD, Mary Jane Wallace MD, Fang Vargas MD,  Martin Sandoval MD, Shalini Murphy MD, Alpesh Nathan MD,  Moe Bell DO, Bennie Crump MD, Ubaldo Higgins MD, Adam Rossi DO, Lore Cat MD,  Matt Arriola DO, Val Castillo MD, Lisette Ibanez MD, Yuliana Yoder MD, Oliverio Valles MD,  Roland Chambers MD, Eber Nicolas MD, Nancy Shahid MD, Babs Morales MD, Dangelo Louise MD, Yue Novoa MD, Jef Beyer DO, Gregg Stevenson MD, Shirley Waterhouse, CNP,  Carley Luna CNP, Jef Ballard, CNP,  Loraine Petersen, JARED, Simona Wylie, CNP, Angela Holly, CNP, Alyce Ford, CNP, Cate Cruz, CNP, Bhakti Marte PA-C, Molly Mcneal PA-C, Amy Maria, CNP, Taryn Bolaños, CNP,  Margoth Catherine, CNP, Georgina Whitman, CNP, Filomena Lin, CNP,  Angela Coleman, CNP, Carmen Grace, CNP         [facility]   IN-PATIENT SERVICE   [facility]    Discharge Summary     Patient ID: Alyce Nolasco  :  1975   MRN: [id number]     ACCOUNT:  [id number]   Patient's PCP: Herb Canada MD  Admit Date: 2024   Discharge Date: 2024  Length of Stay: 3  Code Status:  Full Code  Admitting Physician: Adam Rossi DO  Discharge Physician: Adam Rossi DO     Active Discharge Diagnoses:     Hospital Problem Lists:  Principal Problem:    Complicated UTI (urinary tract infection)  Active Problems:    Intra-abdominal infection after surgical procedure    Fluid collection at surgical site    Urothelial carcinoma (HCC)  Resolved Problems:    * No resolved hospital problems. *      Admission Condition:  fair     Discharged Condition: good    Hospital Stay:     Hospital Course:  Alyce Nolasco is a 49 y.o. female who was admitted for the management of Complicated UTI (urinary tract infection) , presented to ER with Fever and  \"PCO2\", \"POCPO2\", \"PO2ART\", \"PO2\", \"POCHCO3\", \"QHV9GHB\", \"HCO3\", \"NBEA\", \"PBEA\", \"BEART\", \"BE\", \"THGBART\", \"THB\", \"OUI0ZIV\", \"OPXZ5AND\", \"J8NXLCMM\", \"O2SAT\", \"FIO2\"  Lab Results   Component Value Date/Time    SPECIAL POST OP FLUID, NEPHROCTOMY SITE 12/26/2024 09:15 AM     Lab Results   Component Value Date/Time    CULTURE NO GROWTH 12/26/2024 09:15 AM       Radiology:  IR US GUIDED NEEDLE PLACEMENT    Result Date: 12/26/2024  Successful ultrasound-guided aspiration of a postoperative left renal fossa fluid collection.     CT ABDOMEN PELVIS W IV CONTRAST Additional Contrast? None    Result Date: 12/24/2024  1. Left nephrectomy with 10 cm locular gas and fluid collection at the nephrectomy bed, concerning for abscess given the associated gas, though this could reflect hematoma/seroma with postsurgical gas. 2. Typical appearing postsurgical sequela at the left anterior and lateral abdomen/pelvis. 3. Small left rectus muscle hematoma.     XR CHEST (2 VW)    Result Date: 12/24/2024  1. Hyperlucency under the right hemidiaphragm, unclear if this represents overlapping of shadows or free air under the diaphragm. Recommend further evaluation with CT of the abdomen and pelvis. 2. No acute cardiopulmonary process.       Consultations:    Consults:     Final Specialist Recommendations/Findings:   IP CONSULT TO UROLOGY  IP CONSULT TO INFECTIOUS DISEASES  IP CONSULT TO INTERVENTIONAL RADIOLOGY      The patient was seen and examined on day of discharge and this discharge summary is in conjunction with any daily progress note from day of discharge.    Discharge plan:     Disposition: Home    Physician Follow Up:   Steve Estrada MD  [address]  [facility] [address] [phone]    Follow up in 2 week(s)      Herb Canada MD  [address]  [facility] [address] [phone]    Follow up         Requiring Further Evaluation/Follow Up POST HOSPITALIZATION/Incidental Findings: None    Diet:

## 2024-12-27 NOTE — PROGRESS NOTES
Urology Progress Note    Subjective: Patient did well overnight.  No pain.  Tolerating a regular diet.  She is having occasional diarrhea.  No fevers overnight.    Patient Vitals for the past 24 hrs:   BP Temp Temp src Pulse Resp SpO2   12/27/24 0742 (!) 97/57 98.8 °F (37.1 °C) Oral 75 -- 95 %   12/26/24 2022 105/63 98.6 °F (37 °C) Oral (!) 102 16 96 %     No intake or output data in the 24 hours ending 12/27/24 1015    Recent Labs     12/24/24  1342 12/25/24  0600 12/26/24  1216   WBC 8.4 8.7 8.8   HGB 11.6* 9.6* 11.1*   HCT 34.7* 28.6* 33.6*   MCV 91.9 92.3 92.5    326 435     Recent Labs     12/24/24  1342 12/25/24  0600 12/26/24  1216    137 136   K 3.8 4.0 4.1   CL 96* 103 102   CO2 27 23 25   BUN 11 11 9   CREATININE 1.1* 1.1* 1.1*       Recent Labs     12/24/24  1421   COLORU Yellow   PHUR 6.5   LEUKOCYTESUR NEGATIVE   UROBILINOGEN Normal   BILIRUBINUR NEGATIVE       Additional Lab/culture results:     Physical Exam:   No acute distress, appears quite comfortable.  Incisions are clear dry and intact.    Interval Imaging Findings: none    Impression:    Patient Active Problem List   Diagnosis    Renal mass    Complicated UTI (urinary tract infection)    Intra-abdominal infection after surgical procedure    Fluid collection at surgical site    Urothelial carcinoma (HCC)       Plan:   49-year-old female admitted with low-grade fever status post nephro ureterectomy.  She is not postoperative day 10.  Clinically she is doing very well this morning.  Hemoglobin is stable and white blood cell count is normal.  She has been afebrile.  Ultrasound aspiration of fluid showed likely hematoma.  Culture shows no organisms seen.  Antibiotic recommendations per infectious disease.  Okay for discharge from urologic point of view.  Follow-up in 1 week as scheduled.    Jimbo Aly MD  10:15 AM 12/27/2024

## 2024-12-27 NOTE — PROGRESS NOTES
[facility]  Office: [phone]  Gilberto Rossi DO, Guilherme Duke DO, Ez Membreno DO, Ajay Walden DO, Rosy Amos MD, Rebecca Fagan MD, Mary Jane Wallace MD, Fang Vargas MD,  Martin Sandoval MD, Shalini Murphy MD, Alpesh Nathan MD,  Moe Bell DO, Bennie Crump MD, Ubaldo Higgins MD, Adam Rossi DO, Lore Cat MD,  Matt Arriola DO, Val Castillo MD, Lisette Ibanez MD, Yuliana Yoder MD, Oliverio Valles MD,  Roland Chambers MD, Eber Nicolas MD, Nancy Shahid MD, Babs Morales MD, Dangelo Louise MD, Yue Novoa MD, Jef Beyer DO, Gregg Stevenson MD, Shirley Waterhouse, CNP,  Carley Luna CNP, Jef Ballard, MICHAEL,  Loraine Petersen, JARED, Simona Wylie, CNP, Angela Holly, CNP, Alyce Ford, CNP, Cate Cruz, CNP, Bhakti Marte PA-C, Molly Mcneal PA-C, Amy Maria, CNP, Taryn Bolaños, CNP,  Margoth Catherine, CNP, Georgina Whitman, CNP, Filomena Lin, CNP,  Angela Coleman, CNP, Carmen Grace, CNP         [facility]   IN-PATIENT SERVICE   [facility]    Progress Note    12/27/2024    11:01 AM    Name:   Alyce Nolasco  MRN:     [id number]     Acct:      [id number]   Room:   302/302-01   Day:  3  Admit Date:  12/24/2024  1:06 PM    PCP:   Herb Canada MD  Code Status:  Full Code    Subjective:     Patient seen in follow-up for postoperative fever, patient states \"I am feeling better\"    Case discussed with urology and infectious disease.  At this point in time it is difficult to determine whether or not there is a true infection or hematoma however recommendations are to continue antibiotics on discharge.  Patient will be discharged on moxifloxacin 400 mg daily for 7 days and will follow-up with infectious disease on an outpatient basis.  Overall she is feeling well.  She has had some loose stools likely secondary to Zosyn.  I am going to initiate her on Bacid which she will continue on discharge.  Patient denies any questions or concerns

## 2024-12-29 LAB
MICROORGANISM SPEC CULT: NORMAL
MICROORGANISM/AGENT SPEC: NORMAL
MICROORGANISM/AGENT SPEC: NORMAL
SERVICE CMNT-IMP: NORMAL
SPECIMEN DESCRIPTION: NORMAL

## 2025-03-31 ENCOUNTER — TRANSCRIBE ORDERS (OUTPATIENT)
Dept: ADMINISTRATIVE | Age: 50
End: 2025-03-31

## 2025-03-31 DIAGNOSIS — C65.9: Primary | ICD-10-CM

## 2025-04-08 ENCOUNTER — HOSPITAL ENCOUNTER (OUTPATIENT)
Dept: CT IMAGING | Age: 50
Discharge: HOME | End: 2025-04-10
Attending: UROLOGY
Payer: COMMERCIAL

## 2025-04-08 DIAGNOSIS — C65.9: ICD-10-CM

## 2025-04-08 PROCEDURE — 74150 CT ABDOMEN W/O CONTRAST: CPT

## (undated) DEVICE — SUTURE ETHILON SZ 3-0 L18IN NONABSORBABLE BLK L24MM FS-1 3/8 663G

## (undated) DEVICE — STRAP,CATHETER,ELASTIC,HOOK&LOOP: Brand: MEDLINE

## (undated) DEVICE — AGENT HEMSTAT 3GM OXIDIZED REGENERATED CELOS ABSRB FOR CONT (ORDER MULTIPLES OF 5EA)

## (undated) DEVICE — SUTURE VICRYL SZ 0 L36IN ABSRB UD CT-1 L36MM 1/2 CIR TAPR PNT VCP946H

## (undated) DEVICE — STRIP,CLOSURE,WOUND,MEDI-STRIP,1/2X4: Brand: MEDLINE

## (undated) DEVICE — GLOVE ORANGE PI 7 1/2   MSG9075

## (undated) DEVICE — TIP COVER ACCESSORY

## (undated) DEVICE — BLADELESS OBTURATOR: Brand: WECK VISTA

## (undated) DEVICE — Device

## (undated) DEVICE — PLUG,CATHETER,DRAINAGE PROTECTOR,TUBE: Brand: MEDLINE

## (undated) DEVICE — DRAIN SURG 15FR SIL RND CHN W/ TRCR FULL FLUT DBL WRP TRAD

## (undated) DEVICE — PAD,NON-ADHERENT,3X8,STERILE,LF,1/PK: Brand: MEDLINE

## (undated) DEVICE — COUNTER NDL 10 COUNT HLD 20 FOAM BLK SGL MAG

## (undated) DEVICE — SOLUTION IRRIG 1000ML 0.9% SOD CHL USP POUR PLAS BTL

## (undated) DEVICE — APPLICATOR MEDICATED 26 CC SOLUTION HI LT ORNG CHLORAPREP

## (undated) DEVICE — SOLUTION IRRIGATION STRL H2O 1000 ML UROMATIC CONTAINER

## (undated) DEVICE — TRI-LUMEN FILTERED TUBE SET WITH ACTIVATED CHARCOAL FILTER: Brand: AIRSEAL

## (undated) DEVICE — SEAL

## (undated) DEVICE — CONTAINER,SPECIMEN,4OZ,OR STRL: Brand: MEDLINE

## (undated) DEVICE — SINGLE ACTION PUMPING SYSTEM

## (undated) DEVICE — METER,URINE,400ML,DRAIN BAG,L/F,LL: Brand: MEDLINE

## (undated) DEVICE — SOLUTION IRRIG 1000ML STRL H2O USP PLAS POUR BTL

## (undated) DEVICE — CATHETER URETH 20FR BLLN 30CC 3 W F SPEC INF CTRL BARDX

## (undated) DEVICE — SYRINGE MED 30ML STD CLR PLAS LUERLOCK TIP N CTRL DISP

## (undated) DEVICE — SUTURE PDS II SZ 0 L60IN ABSRB VLT L48MM CTX 1/2 CIR Z990G

## (undated) DEVICE — 3M™ IOBAN™ 2 ANTIMICROBIAL INCISE DRAPE 6650EZ: Brand: IOBAN™ 2

## (undated) DEVICE — SET,IRRIGATION,CYSTO/TUR,90": Brand: MEDLINE

## (undated) DEVICE — TROCAR: Brand: KII FIOS FIRST ENTRY

## (undated) DEVICE — SUTURE MONOCRYL + SZ 4-0 L27IN ABSRB UD L19MM PS-2 3/8 CIR MCP426H

## (undated) DEVICE — ADAPTER URO SCP UROLOK LL

## (undated) DEVICE — ARM DRAPE

## (undated) DEVICE — YANKAUER,POOLE TIP,STERILE,50/CS: Brand: MEDLINE

## (undated) DEVICE — SUTURE VICRYL + SZ 0 L18IN ABSRB UD L36MM CT-1 1/2 CIR VCP840D

## (undated) DEVICE — SYRINGE,PISTON,IRRIGATION,60ML,STERILE: Brand: MEDLINE

## (undated) DEVICE — SYRINGE MED 20ML STD CLR PLAS LUERLOCK TIP N CTRL DISP

## (undated) DEVICE — SINGLE PORT MANIFOLD: Brand: NEPTUNE 2

## (undated) DEVICE — LIQUIBAND RAPID ADHESIVE 36/CS 0.8ML: Brand: MEDLINE

## (undated) DEVICE — CONE TIP URETERAL CATHETER: Brand: URETERAL CATHETER

## (undated) DEVICE — BLANKET WRM W29.9XL79.1IN UP BODY FORC AIR MISTRAL-AIR

## (undated) DEVICE — COVER,MAYO STAND,XL,STERILE: Brand: MEDLINE

## (undated) DEVICE — RELOAD STPL L60MM H1-2.6MM MESENTERY THN TISS WHT 6 ROW

## (undated) DEVICE — SUTURE VICRYL + SZ 3-0 L27IN ABSRB UD L26MM SH 1/2 CIR VCP416H

## (undated) DEVICE — 1LYRTR 16FR10ML 100%SILI SNAP: Brand: MEDLINE INDUSTRIES, INC.

## (undated) DEVICE — SURGICEL ENDOSCP APPL

## (undated) DEVICE — GOWN,SIRUS,NONRNF,SETINSLV,XL,20/CS: Brand: MEDLINE

## (undated) DEVICE — INSUFFLATION NEEDLE TO ESTABLISH PNEUMOPERITONEUM.: Brand: INSUFFLATION NEEDLE

## (undated) DEVICE — SPONGE LAP W18XL18IN WHT COT 4 PLY FLD STRUNG RADPQ DISP ST 2 PER PACK

## (undated) DEVICE — GUIDEWIRE URO L150CM DIA .035IN STIFF NIT HYDRPHL STR TIP

## (undated) DEVICE — AIRSEAL 12 MM ACCESS PORT AND PALM GRIP OBTURATOR WITH BLADELESS OPTICAL TIP, 120 MM LENGTH: Brand: AIRSEAL

## (undated) DEVICE — BAG SPEC LAP H9IN DIA75IN 1500ML 10 12MM CANN ATTCH MEM

## (undated) DEVICE — ST CHARLES CYSTO: Brand: MEDLINE INDUSTRIES, INC.

## (undated) DEVICE — SOLUTION IRRIG 3000ML 0.9% SOD CHL USP UROMATIC PLAS CONT

## (undated) DEVICE — RESERVOIR,SUCTION,100CC,SILICONE: Brand: MEDLINE

## (undated) DEVICE — ST CHARLES GEN LAPAROSCOPY: Brand: MEDLINE INDUSTRIES, INC.

## (undated) DEVICE — ELECTRO LUBE IS A SINGLE PATIENT USE DEVICE THAT IS INTENDED TO BE USED ON ELECTROSURGICAL ELECTRODES TO REDUCE STICKING.: Brand: KEY SURGICAL ELECTRO LUBE

## (undated) DEVICE — AGENT HEMOSTATIC SURG ORIGINAL ABS 2X3IN LOOSE KNIT 12/CA

## (undated) DEVICE — STAPLER 60MM POWERED ECHELON 3000  SHORT 340MM